# Patient Record
Sex: FEMALE | Race: WHITE | HISPANIC OR LATINO | Employment: FULL TIME | URBAN - METROPOLITAN AREA
[De-identification: names, ages, dates, MRNs, and addresses within clinical notes are randomized per-mention and may not be internally consistent; named-entity substitution may affect disease eponyms.]

---

## 2017-01-12 ENCOUNTER — GENERIC CONVERSION - ENCOUNTER (OUTPATIENT)
Dept: OTHER | Facility: OTHER | Age: 32
End: 2017-01-12

## 2017-01-19 ENCOUNTER — ALLSCRIPTS OFFICE VISIT (OUTPATIENT)
Dept: OTHER | Facility: OTHER | Age: 32
End: 2017-01-19

## 2017-02-01 ENCOUNTER — GENERIC CONVERSION - ENCOUNTER (OUTPATIENT)
Dept: OTHER | Facility: OTHER | Age: 32
End: 2017-02-01

## 2017-02-17 ENCOUNTER — ALLSCRIPTS OFFICE VISIT (OUTPATIENT)
Dept: OTHER | Facility: OTHER | Age: 32
End: 2017-02-17

## 2017-02-22 ENCOUNTER — GENERIC CONVERSION - ENCOUNTER (OUTPATIENT)
Dept: OTHER | Facility: OTHER | Age: 32
End: 2017-02-22

## 2017-03-15 ENCOUNTER — GENERIC CONVERSION - ENCOUNTER (OUTPATIENT)
Dept: OTHER | Facility: OTHER | Age: 32
End: 2017-03-15

## 2017-03-16 ENCOUNTER — ALLSCRIPTS OFFICE VISIT (OUTPATIENT)
Dept: OTHER | Facility: OTHER | Age: 32
End: 2017-03-16

## 2017-03-16 DIAGNOSIS — M51.17 INTERVERTEBRAL DISC DISORDER WITH RADICULOPATHY OF LUMBOSACRAL REGION: ICD-10-CM

## 2017-03-17 ENCOUNTER — GENERIC CONVERSION - ENCOUNTER (OUTPATIENT)
Dept: OTHER | Facility: OTHER | Age: 32
End: 2017-03-17

## 2017-04-05 ENCOUNTER — GENERIC CONVERSION - ENCOUNTER (OUTPATIENT)
Dept: OTHER | Facility: OTHER | Age: 32
End: 2017-04-05

## 2017-04-06 ENCOUNTER — ALLSCRIPTS OFFICE VISIT (OUTPATIENT)
Dept: OTHER | Facility: OTHER | Age: 32
End: 2017-04-06
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
## 2017-04-25 ENCOUNTER — GENERIC CONVERSION - ENCOUNTER (OUTPATIENT)
Dept: OTHER | Facility: OTHER | Age: 32
End: 2017-04-25

## 2017-05-04 ENCOUNTER — ALLSCRIPTS OFFICE VISIT (OUTPATIENT)
Dept: OTHER | Facility: OTHER | Age: 32
End: 2017-05-04

## 2017-05-16 ENCOUNTER — ALLSCRIPTS OFFICE VISIT (OUTPATIENT)
Dept: OTHER | Facility: OTHER | Age: 32
End: 2017-05-16

## 2017-05-30 ENCOUNTER — HOSPITAL ENCOUNTER (EMERGENCY)
Facility: HOSPITAL | Age: 32
Discharge: HOME | End: 2017-05-30
Attending: EMERGENCY MEDICINE | Admitting: EMERGENCY MEDICINE

## 2017-05-30 VITALS
RESPIRATION RATE: 18 BRPM | TEMPERATURE: 98.2 F | BODY MASS INDEX: 39.27 KG/M2 | SYSTOLIC BLOOD PRESSURE: 110 MMHG | DIASTOLIC BLOOD PRESSURE: 74 MMHG | HEIGHT: 64 IN | OXYGEN SATURATION: 97 % | HEART RATE: 70 BPM | WEIGHT: 230 LBS

## 2017-05-30 DIAGNOSIS — R51.9 HEADACHE: Primary | ICD-10-CM

## 2017-05-30 LAB
BACTERIA UR QL AUTO: ABNORMAL /HPF
BILIRUB UR QL STRIP: NEGATIVE
CLARITY UR: CLEAR
COLOR UR: YELLOW
COLOR, POC: NORMAL
GLUCOSE UR STRIP-MCNC: NEGATIVE MG/DL
HCG UR QL: NEGATIVE
HGB UR QL STRIP.AUTO: ABNORMAL
HYALINE CASTS #/AREA URNS LPF: ABNORMAL /LPF
KETONES UR STRIP-MCNC: NEGATIVE MG/DL
LEUKOCYTE ESTERASE UR QL STRIP: NEGATIVE
NITRITE UR QL STRIP: NEGATIVE
NON-SQ EPI CELLS URNS QL MICRO: ABNORMAL /HPF
PH UR STRIP.AUTO: 6 [PH] (ref 4.5–8)
PROT UR STRIP-MCNC: ABNORMAL MG/DL
RBC #/AREA URNS AUTO: ABNORMAL /HPF
SP GR UR STRIP.AUTO: 1.02 (ref 1–1.03)
UROBILINOGEN UR QL STRIP.AUTO: 0.2 E.U./DL
WBC #/AREA URNS AUTO: ABNORMAL /HPF

## 2017-05-30 PROCEDURE — 96361 HYDRATE IV INFUSION ADD-ON: CPT

## 2017-05-30 PROCEDURE — 96374 THER/PROPH/DIAG INJ IV PUSH: CPT

## 2017-05-30 PROCEDURE — 81002 URINALYSIS NONAUTO W/O SCOPE: CPT | Performed by: EMERGENCY MEDICINE

## 2017-05-30 PROCEDURE — 81001 URINALYSIS AUTO W/SCOPE: CPT

## 2017-05-30 PROCEDURE — 99283 EMERGENCY DEPT VISIT LOW MDM: CPT

## 2017-05-30 PROCEDURE — 96375 TX/PRO/DX INJ NEW DRUG ADDON: CPT

## 2017-05-30 PROCEDURE — 81025 URINE PREGNANCY TEST: CPT | Performed by: EMERGENCY MEDICINE

## 2017-05-30 RX ORDER — KETOROLAC TROMETHAMINE 30 MG/ML
15 INJECTION, SOLUTION INTRAMUSCULAR; INTRAVENOUS ONCE
Status: COMPLETED | OUTPATIENT
Start: 2017-05-30 | End: 2017-05-30

## 2017-05-30 RX ORDER — METOCLOPRAMIDE HYDROCHLORIDE 5 MG/ML
10 INJECTION INTRAMUSCULAR; INTRAVENOUS ONCE
Status: COMPLETED | OUTPATIENT
Start: 2017-05-30 | End: 2017-05-30

## 2017-05-30 RX ORDER — DIPHENHYDRAMINE HYDROCHLORIDE 50 MG/ML
25 INJECTION INTRAMUSCULAR; INTRAVENOUS ONCE
Status: COMPLETED | OUTPATIENT
Start: 2017-05-30 | End: 2017-05-30

## 2017-05-30 RX ADMIN — METOCLOPRAMIDE 10 MG: 5 INJECTION, SOLUTION INTRAMUSCULAR; INTRAVENOUS at 16:15

## 2017-05-30 RX ADMIN — SODIUM CHLORIDE 1000 ML: 0.9 INJECTION, SOLUTION INTRAVENOUS at 16:15

## 2017-05-30 RX ADMIN — DIPHENHYDRAMINE HYDROCHLORIDE 25 MG: 50 INJECTION, SOLUTION INTRAMUSCULAR; INTRAVENOUS at 16:15

## 2017-05-30 RX ADMIN — KETOROLAC TROMETHAMINE 15 MG: 30 INJECTION, SOLUTION INTRAMUSCULAR at 16:15

## 2017-06-06 ENCOUNTER — ALLSCRIPTS OFFICE VISIT (OUTPATIENT)
Dept: OTHER | Facility: OTHER | Age: 32
End: 2017-06-06

## 2017-06-06 DIAGNOSIS — D64.9 ANEMIA: ICD-10-CM

## 2017-06-06 DIAGNOSIS — Z00.00 ENCOUNTER FOR GENERAL ADULT MEDICAL EXAMINATION WITHOUT ABNORMAL FINDINGS: ICD-10-CM

## 2017-06-06 DIAGNOSIS — M54.16 RADICULOPATHY OF LUMBAR REGION: ICD-10-CM

## 2017-06-07 ENCOUNTER — APPOINTMENT (OUTPATIENT)
Dept: LAB | Facility: HOSPITAL | Age: 32
End: 2017-06-07

## 2017-06-07 ENCOUNTER — TRANSCRIBE ORDERS (OUTPATIENT)
Dept: LAB | Facility: HOSPITAL | Age: 32
End: 2017-06-07

## 2017-06-07 DIAGNOSIS — Z00.00 ENCOUNTER FOR GENERAL ADULT MEDICAL EXAMINATION WITHOUT ABNORMAL FINDINGS: ICD-10-CM

## 2017-06-07 DIAGNOSIS — D64.9 ANEMIA: ICD-10-CM

## 2017-06-07 DIAGNOSIS — M54.16 RADICULOPATHY OF LUMBAR REGION: ICD-10-CM

## 2017-06-07 LAB
ALBUMIN SERPL BCP-MCNC: 3.3 G/DL (ref 3.5–5)
ALP SERPL-CCNC: 82 U/L (ref 46–116)
ALT SERPL W P-5'-P-CCNC: 23 U/L (ref 12–78)
ANION GAP SERPL CALCULATED.3IONS-SCNC: 8 MMOL/L (ref 4–13)
AST SERPL W P-5'-P-CCNC: 19 U/L (ref 5–45)
BASOPHILS # BLD AUTO: 0.07 THOUSANDS/ΜL (ref 0–0.1)
BASOPHILS NFR BLD AUTO: 1 % (ref 0–1)
BILIRUB SERPL-MCNC: 0.56 MG/DL (ref 0.2–1)
BUN SERPL-MCNC: 9 MG/DL (ref 5–25)
CALCIUM SERPL-MCNC: 9.1 MG/DL (ref 8.3–10.1)
CHLORIDE SERPL-SCNC: 106 MMOL/L (ref 100–108)
CHOLEST SERPL-MCNC: 173 MG/DL (ref 50–200)
CO2 SERPL-SCNC: 26 MMOL/L (ref 21–32)
CREAT SERPL-MCNC: 0.81 MG/DL (ref 0.6–1.3)
EOSINOPHIL # BLD AUTO: 0.5 THOUSAND/ΜL (ref 0–0.61)
EOSINOPHIL NFR BLD AUTO: 6 % (ref 0–6)
ERYTHROCYTE [DISTWIDTH] IN BLOOD BY AUTOMATED COUNT: 12.7 % (ref 11.6–15.1)
GFR SERPL CREATININE-BSD FRML MDRD: >60 ML/MIN/1.73SQ M
GLUCOSE P FAST SERPL-MCNC: 82 MG/DL (ref 65–99)
HCT VFR BLD AUTO: 38.7 % (ref 34.8–46.1)
HDLC SERPL-MCNC: 51 MG/DL (ref 40–60)
HGB BLD-MCNC: 12.6 G/DL (ref 11.5–15.4)
LDLC SERPL CALC-MCNC: 111 MG/DL (ref 0–100)
LYMPHOCYTES # BLD AUTO: 3.11 THOUSANDS/ΜL (ref 0.6–4.47)
LYMPHOCYTES NFR BLD AUTO: 35 % (ref 14–44)
MCH RBC QN AUTO: 30.4 PG (ref 26.8–34.3)
MCHC RBC AUTO-ENTMCNC: 32.6 G/DL (ref 31.4–37.4)
MCV RBC AUTO: 94 FL (ref 82–98)
MONOCYTES # BLD AUTO: 0.86 THOUSAND/ΜL (ref 0.17–1.22)
MONOCYTES NFR BLD AUTO: 10 % (ref 4–12)
NEUTROPHILS # BLD AUTO: 4.46 THOUSANDS/ΜL (ref 1.85–7.62)
NEUTS SEG NFR BLD AUTO: 48 % (ref 43–75)
NRBC BLD AUTO-RTO: 0 /100 WBCS
PLATELET # BLD AUTO: 351 THOUSANDS/UL (ref 149–390)
PMV BLD AUTO: 10.2 FL (ref 8.9–12.7)
POTASSIUM SERPL-SCNC: 3.6 MMOL/L (ref 3.5–5.3)
PROT SERPL-MCNC: 8.1 G/DL (ref 6.4–8.2)
RBC # BLD AUTO: 4.14 MILLION/UL (ref 3.81–5.12)
SODIUM SERPL-SCNC: 140 MMOL/L (ref 136–145)
TRIGL SERPL-MCNC: 53 MG/DL
VIT B12 SERPL-MCNC: 522 PG/ML (ref 100–900)
WBC # BLD AUTO: 9.02 THOUSAND/UL (ref 4.31–10.16)

## 2017-06-07 PROCEDURE — 80061 LIPID PANEL: CPT

## 2017-06-07 PROCEDURE — 85025 COMPLETE CBC W/AUTO DIFF WBC: CPT

## 2017-06-07 PROCEDURE — 36415 COLL VENOUS BLD VENIPUNCTURE: CPT

## 2017-06-07 PROCEDURE — 82607 VITAMIN B-12: CPT

## 2017-06-07 PROCEDURE — 80053 COMPREHEN METABOLIC PANEL: CPT

## 2017-06-08 ENCOUNTER — ALLSCRIPTS OFFICE VISIT (OUTPATIENT)
Dept: OTHER | Facility: OTHER | Age: 32
End: 2017-06-08

## 2017-06-13 ENCOUNTER — ALLSCRIPTS OFFICE VISIT (OUTPATIENT)
Dept: OTHER | Facility: OTHER | Age: 32
End: 2017-06-13

## 2017-08-17 ENCOUNTER — ALLSCRIPTS OFFICE VISIT (OUTPATIENT)
Dept: OTHER | Facility: OTHER | Age: 32
End: 2017-08-17

## 2017-08-30 ENCOUNTER — HOSPITAL ENCOUNTER (EMERGENCY)
Facility: HOSPITAL | Age: 32
Discharge: HOME | End: 2017-08-30
Attending: EMERGENCY MEDICINE | Admitting: EMERGENCY MEDICINE

## 2017-08-30 VITALS
WEIGHT: 230 LBS | OXYGEN SATURATION: 99 % | DIASTOLIC BLOOD PRESSURE: 60 MMHG | HEART RATE: 86 BPM | SYSTOLIC BLOOD PRESSURE: 129 MMHG | HEIGHT: 64 IN | BODY MASS INDEX: 39.27 KG/M2 | TEMPERATURE: 98.3 F | RESPIRATION RATE: 20 BRPM

## 2017-08-30 DIAGNOSIS — G89.29 CHRONIC BILATERAL LOW BACK PAIN WITH RIGHT-SIDED SCIATICA: Primary | ICD-10-CM

## 2017-08-30 DIAGNOSIS — M54.41 CHRONIC BILATERAL LOW BACK PAIN WITH RIGHT-SIDED SCIATICA: Primary | ICD-10-CM

## 2017-08-30 PROCEDURE — 99283 EMERGENCY DEPT VISIT LOW MDM: CPT

## 2017-08-30 PROCEDURE — 96372 THER/PROPH/DIAG INJ SC/IM: CPT

## 2017-08-30 RX ORDER — NORTRIPTYLINE HYDROCHLORIDE 10 MG/1
10 CAPSULE ORAL
Qty: 10 CAPSULE | Refills: 0 | Status: SHIPPED | OUTPATIENT
Start: 2017-08-30 | End: 2018-02-09

## 2017-08-30 RX ORDER — IBUPROFEN 200 MG
600 TABLET ORAL EVERY 6 HOURS PRN
COMMUNITY
End: 2018-02-09

## 2017-08-30 RX ORDER — KETOROLAC TROMETHAMINE 30 MG/ML
15 INJECTION, SOLUTION INTRAMUSCULAR; INTRAVENOUS ONCE
Status: COMPLETED | OUTPATIENT
Start: 2017-08-30 | End: 2017-08-30

## 2017-08-30 RX ORDER — ACETAMINOPHEN 325 MG/1
975 TABLET ORAL ONCE
Status: COMPLETED | OUTPATIENT
Start: 2017-08-30 | End: 2017-08-30

## 2017-08-30 RX ORDER — LIDOCAINE 50 MG/G
1 PATCH TOPICAL ONCE
Status: DISCONTINUED | OUTPATIENT
Start: 2017-08-30 | End: 2017-08-30 | Stop reason: HOSPADM

## 2017-08-30 RX ORDER — CETIRIZINE HYDROCHLORIDE 10 MG/1
10 TABLET ORAL DAILY
COMMUNITY

## 2017-08-30 RX ADMIN — ACETAMINOPHEN 975 MG: 325 TABLET, FILM COATED ORAL at 18:44

## 2017-08-30 RX ADMIN — KETOROLAC TROMETHAMINE 15 MG: 30 INJECTION, SOLUTION INTRAMUSCULAR at 18:46

## 2017-08-30 RX ADMIN — LIDOCAINE 1 PATCH: 50 PATCH CUTANEOUS at 18:49

## 2017-09-20 ENCOUNTER — ALLSCRIPTS OFFICE VISIT (OUTPATIENT)
Dept: OTHER | Facility: OTHER | Age: 32
End: 2017-09-20

## 2017-10-05 ENCOUNTER — ALLSCRIPTS OFFICE VISIT (OUTPATIENT)
Dept: RADIOLOGY | Facility: CLINIC | Age: 32
End: 2017-10-05
Payer: OTHER MISCELLANEOUS

## 2017-10-12 ENCOUNTER — ALLSCRIPTS OFFICE VISIT (OUTPATIENT)
Dept: OTHER | Facility: OTHER | Age: 32
End: 2017-10-12

## 2017-10-12 DIAGNOSIS — M51.17 INTERVERTEBRAL DISC DISORDER WITH RADICULOPATHY OF LUMBOSACRAL REGION: ICD-10-CM

## 2017-10-26 NOTE — PROGRESS NOTES
Assessment  1  Sacroiliitis (720 2) (M46 1)   2  Intervertebral disc disorders with radiculopathy, lumbosacral region (724 4) (M51 17)    Plan  Intervertebral disc disorders with radiculopathy, lumbosacral region    · Lyrica 75 MG Oral Capsule; Take 1 capsule twice daily   Rx By: Zoe Clinton; Dispense: 30 Days ; #:60 Capsule; Refill: 1;For: Intervertebral disc disorders with radiculopathy, lumbosacral region; MADISON = N; Print Rx  Lumbar radiculopathy    · Gabapentin 100 MG Oral Capsule   Rx By: Nii Bennett; Dispense: 30 Days ; #:60 Capsule; Refill: 5;For: Lumbar radiculopathy; MADISON = N; Sent To: [facility] # [id number]; Last Updated By: Zoe Clinton; 9/20/2017 11:06:41 AM    Discussion/Summary    The patient at this time is experiencing ongoing pain in the right buttock and leg  She did not respond to the 2 lumbar epidural steroid injections that were previously performed, so I recommended not repeating that  She has a component of sacroiliitis I discussed performing a right sacroiliac joint injection to help reduce swelling and inflammation in that area  She was apprised of the most common risks and would like to proceed  She'll be scheduled for an upcoming Tuesday or Thursday under fluoroscopic guidance  In addition, since gabapentin is not providing significant relief, I will switch her to Lyrica 75 mg twice daily  She will call with an update next week on how the medication is working  Chief Complaint  1  Back Pain    History of Present Illness  The patient is a 75-year-old female with a history of lumbar disc herniation with radiculopathy who returns for follow-up  She was last seen in December 2016 at which time she received an epidural steroid injection which was which led to no relief in symptoms  She is continuing with constant pain in the lower back down the right leg described to be sharp, throbbing, pressure-like with numbness and paresthesias  She was sent for a possible third epidural steroid injection  She continues on gabapentin 100 mg twice daily which provides mild relief  Yaz Obrien presents with complaints of gradual onset of constant episodes of bilateral lower back pain, described as sharp, stinging, throbbing and tingling, radiating to the right buttock, right thigh and right lower leg  On a scale of 1 to 10, the patient rates the pain as 10  Symptoms are worsening  Review of Systems    Constitutional: no fever,-- no recent weight gain-- and-- no recent weight loss  Eyes: no double vision-- and-- no blurry vision  Cardiovascular: no chest pain,-- no palpitations-- and-- no lower extremity edema  Respiratory: no complaints of shortness of breath-- and-- no wheezing  Musculoskeletal: difficulty walking-- and-- muscle weakness, but-- no joint stiffness,-- no joint swelling,-- no limb swelling,-- no pain in extremity-- and-- no decreased range of motion  Neurological: no dizziness,-- no difficulty swallowing,-- no memory loss,-- no loss of consciousness-- and-- no seizures  Gastrointestinal: no nausea,-- no vomiting,-- no constipation-- and-- no diarrhea  Genitourinary: no difficulty initiating urine stream,-- no genital pain-- and-- no frequent urination  Integumentary: no complaints of skin rash  Psychiatric: no depression  Endocrine: no excessive thirst,-- no adrenal disease,-- no hypothyroidism-- and-- no hyperthyroidism  Hematologic/Lymphatic: no tendency for easy bruising-- and-- no tendency for easy bleeding  ROS reviewed  Active Problems  1  Abnormal weight gain (783 1) (R63 5)   2  Allergic rhinitis (477 9) (J30 9)   3  Anemia (285 9) (D64 9)   4  Chronic pain disorder (338 4) (G89 4)   5  Depression with anxiety (300 4) (F41 8)   6  Eczema (692 9) (L30 9)   7  Intervertebral disc disorders with radiculopathy, lumbosacral region (724 4) (M51 17)   8  Lumbar radiculopathy (724 4) (M54 16)   9   Morbid obesity due to excess calories (278 01) (E66 01)   10  Spinal stenosis of lumbar region (724 02) (M48 06)   11  Work related injury (959 9) (Y99 0)    Past Medical History  1  History of Cellulitis of hand (682 4) (L03 119)   2  Eczema (692 9) (L30 9)    The active problems and past medical history were reviewed and updated today  Surgical History  1  History of Back Surgery    The surgical history was reviewed and updated today  Family History  Mother    1  Family history of anemia (V18 2) (Z83 2)   2  Family history of hypertension (V17 49) (Z82 49)   3  Family history of migraine headaches (V17 2) (Z82 0)  Father    4  Family history of Coronary Artery Disease (V17 49)   5  Family history of    6  Family history of hypertension (V17 49) (Z82 49)  Sister    9  Family history of asthma (V17 5) (Z82 5)  Brother    8  Family history of asthma (V17 5) (Z82 5)  Uncle    9  Family history of bipolar disorder (V17 0) (Z81 8)  Family History    10  Family history of Asthma (V17 5)   11  Family history of bipolar disorder (V17 0) (Z81 8)   12  Denied: Family history of drug addiction    The family history was reviewed and updated today  Social History   · Being A Social Drinker   · Caffeine use (V49 89) (F15 90)   · Current Every Day Smoker (305 1)   · Does not exercise (V69 0) (Z72 3)   · Has 1 child   · High school or GED   · Lives with mother (single parent)   · Occupation   · Sexual abstinence  The social history was reviewed and updated today  The social history was reviewed and is unchanged  Current Meds   1  Advil 200 MG Oral Capsule; TAKE 2 CAPSULE Daily PRN; Therapy: (Recorded:26Qab6979) to Recorded   2  Benadryl 25 MG CAPS; TAKE 1 CAPSULE AT BEDTIME; Therapy: (Recorded:19Lxb1896) to Recorded   3  BusPIRone HCl - 10 MG Oral Tablet; TAKE 1 TABLET AT BEDTIME; Therapy: 57TEA4269 to (Evaluate:35Cbl0798)  Requested for: 27CWQ8193; Last   Rx:2017 Ordered   4  Gabapentin 100 MG Oral Capsule;  Take 1 capsule twice daily; Therapy: 96XPV7413 to (Evaluate:53Pbr9047)  Requested for: 76MJX1201; Last   Rx:06Jun2017; Status: ACTIVE - Transmit to Pharmacy - Awaiting Verification Ordered   5  Meloxicam 7 5 MG Oral Tablet; TAKE 1 TABLET TWICE DAILY WITH FOOD; Therapy: 34MJL4657 to (Evaluate:01Lbp6605)  Requested for: 70USI0921; Last   Rx:16Mar2017 Ordered   6  Vanicream External Cream; APPLY SPARINGLY TO AFFECTED AREA(S) TWICE DAILY; Therapy: 06GCU1610 to (Last Rx:06Jun2017)  Requested for: 06Jun2017; Status: ACTIVE   - Transmit to Pharmacy - Awaiting Verification Ordered   7  ZyrTEC Allergy 10 MG Oral Tablet; Take 1 tablet daily; Therapy: (Recorded:16May2017) to Recorded    The medication list was reviewed and updated today  Allergies  1  Cipro SOLN  2  Peanuts   3  Seasonal    Vitals  Vital Signs    Recorded: 51CQT5676 10:33AM   Temperature 98 8 F   Heart Rate 82   Respiration 18   Systolic 579   Diastolic 78   Height 5 ft 4 in   Weight 229 lb    BMI Calculated 39 31   BSA Calculated 2 07   O2 Saturation 98   Pain Scale 10     Physical Exam    Constitutional   General appearance: Abnormal   obese  Eyes   Sclera: anicteric   HEENT   Hearing grossly intact  Pulmonary   Respiratory effort: Even and unlabored  Cardiovascular   Examination of extremities: No edema or pitting edema present  Skin   Skin and subcutaneous tissue: Normal without rashes or lesions, well hydrated  Psychiatric   Mood and affect: Mood and affect appropriate  Lumbar/Sacral Spine examination demonstrates Lumbosacral Spine:   Appearance: Normal    Tenderness: at the sacral spine,-- right sciatic notch-- and-- the right sacroiliac joint  Lumbosacral Spine Sensory: intact to light touch and pinprick in the lower extremities  Flexion was restricted-- and-- was painful  Extension was restricted-- and-- was painful  Left lateral flexion was not restricted-- and-- was painless  Right lateral flexion was restricted-- and-- was painful  Rotation to the left was not restricted-- and-- was painless  Rotation to the right was restricted-- and-- was painful  Foot and ankle strength was normal bilaterally  Knee strength was normal bilaterally  Hip strength was normal bilaterally  Results/Data  Results Free Text Form [facility]:   Results    I personally reviewed the films/images in the office today        Future Appointments    Date/Time Provider Specialty Site   10/05/2017 08:00 AM Lianet Laguna,  Physiatry [facility]     Signatures   Electronically signed by : Marleny Rasheed MD; Sep 20 2017 11:08AM EST                       (Author)

## 2017-11-07 ENCOUNTER — ALLSCRIPTS OFFICE VISIT (OUTPATIENT)
Dept: OTHER | Facility: OTHER | Age: 32
End: 2017-11-07

## 2017-12-05 ENCOUNTER — ALLSCRIPTS OFFICE VISIT (OUTPATIENT)
Dept: RADIOLOGY | Facility: CLINIC | Age: 32
End: 2017-12-05
Payer: OTHER MISCELLANEOUS

## 2017-12-29 ENCOUNTER — ALLSCRIPTS OFFICE VISIT (OUTPATIENT)
Dept: OTHER | Facility: OTHER | Age: 32
End: 2017-12-29

## 2018-01-10 NOTE — PROGRESS NOTES
Assessment    1  Intervertebral disc disorders with radiculopathy, lumbosacral region (724 4) (M51 17)   2  Work related injury (959 9) (Y99 0)    Plan  Intervertebral disc disorders with radiculopathy, lumbosacral region    · Follow-up Visit in 4 Weeks Evaluation and Treatment  Follow-up  Status: Hold For -  Scheduling  Requested for: 08ZPE2556    Discussion/Summary  Currently, the condition is improving  Impression: low back pain and disc herniation  There are no changes in medication management  Treatment plan includes physical therapy  Patient discussion: discussed with the patient  Chief Complaint  Work related LBP   1/19 follow up   accompanied by   2/17 follow up accompanied by   History of Present Illness  33 t yo female with discogenic LBP   has had two Stephen   has phone follow up pending next week   states no pain during daytime if active   notes pain with recumbant position at HS, local to SI, feels like it moves   no PT since October due to school schedule  Is on per belkis care at [facility]   has restrictions  Has had prior IDET 2012 (Naftulin) Taking no meds for LBP  Feels 70 % improvement with LESIs  Had been working with restriction untill September  Has DONTA with [employer] in January 1/19 had DONTA and released to full duty   has yet to be sent official  to usual occupation  last worked September  Viktoriya Valdovinos on per belkis now   reports URI since Decc seen at ED  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 O radicular component  , but as noted in HPI  Active Problems    1  Allergic rhinitis (477 9) (J30 9)   2   Cellulitis of hand (062 4) (L03 119)   3  Eczema (692 9) (L30 9)   4  Intervertebral disc disorders with radiculopathy, lumbosacral region (724 4) (M51 17)   5  Lumbar radiculopathy (724 4) (M54 16)    Past Medical History    1  Eczema (692 9) (L30 9)    The active problems and past medical history were reviewed and updated today  Surgical History    1  History of Back Surgery    The surgical history was reviewed and updated today  Family History  Father    1  Family history of Coronary Artery Disease (V17 49)  Family History    2  Family history of Asthma (V17 5)    Social History    · Being A Social Drinker   · Current Every Day Smoker (305 1)    Current Meds   1  Benadryl Allergy 25 MG Oral Tablet; Therapy: (Recorded:12Awg6720) to Recorded   2  Ibuprofen 800 MG Oral Tablet; Therapy: (Concha Nj) to Recorded   3  Meloxicam 7 5 MG Oral Tablet; TAKE 1 TABLET TWICE DAILY WITH FOOD; Therapy: 04SHY4374 to (74 831 591)  Requested for: 36RVC9990; Last   Rx:89Hwk1366 Ordered   4  ZyrTEC Allergy 10 MG Oral Capsule; Therapy: (Recorded:49Ddr1600) to Recorded    The medication list was reviewed and updated today  Allergies    1  Cipro SOLN    Vitals  Signs   Recorded: 87ESL2428 08:57AM   Heart Rate: 176  Systolic: 764  Diastolic: 86  Height: 5 ft 4 in  Weight: 225 lb   BMI Calculated: 38 62  BSA Calculated: 2 07    Physical Exam    Constitutional   General appearance: Abnormal   overweight  Lumbar/Sacral Spine examination demonstrates Lumbosacral Spine:   Evaluation of Muscle Stretch Reflexes on the right side demonstrates 1/4 Hamstring Reflex, 1/4 Knee Jerk Reflex and 1/4 Ankle Jerk Reflex, but negative right ankle clonus  Evaluation of Muscle Stretch Reflexes on the left side demonstrates 1/4 Hamstring Reflex, 1/4 Knee Jerk Reflex, 1/4 Ankle Jerk Reflex and negative left ankle clonus  Results/Data    Diagnostic Review [place] report from 2/15  [facility] [place] [facility] [place] pt not at 50 # yet and four weeks of PTx advised  Signatures   Electronically signed by : Melita Clinton DO; Feb 17 2017  9:21AM EST                       (Author)

## 2018-01-10 NOTE — PROGRESS NOTES
Assessment    1  Intervertebral disc disorders with radiculopathy, lumbosacral region (724 4) (M51 17)   2  Lumbar radiculopathy (724 4) (M54 16)    Plan  Lumbar radiculopathy    · Follow-up Visit in 4 Weeks Evaluation and Treatment  Follow-up  Status: Hold For -  Scheduling  Requested for: 93XNG0279    Continue PTx and physical restrictions  Discussion/Summary  Impression: low back pain, lumbar strain and degenerative disc disease of the lumbar spine  Currently, the condition is improving  There are no changes in medication management  Treatment plan includes activity modification and physical therapy  Patient discussion: discussed with the patient  Chief Complaint  Work related LBP   1/19 follow up   accompanied by       History of Present Illness  33 t yo female with discogenic LBP   has had two Stephen   has phone follow up pending next week   states no pain during daytime if active   notes pain with recumbant position at HS, local to , feels like it moves   no PT since October due to school schedule  Is on per belkis care at [facility] [employer]   has restrictions  Has had prior IDET 2012 (Naftulin) Taking no meds for LBP  Feels 70 % improvement with LESIs  Had been working with restriction untill September  Has DONTA with [employer] in January 1/19 had DONTA and released to full duty   has yet to be sent official  to usual occupation  last worked September  Lisa Villasenor on per belkis now   reports URI since Decc seen at ED  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  Currently today some left lateral hip and buttock pain  Attends PT at [facility] last two weeks  Has no further caer planned thru PM       Review of Systems    Neurological: no numbness  Active Problems    1  Allergic rhinitis (477 9) (J30 9)   2  Cellulitis of hand (682 4) (L03 119)   3  Eczema (692 9) (L30 9)   4  Intervertebral disc disorders with radiculopathy, lumbosacral region (724 4) (M51 17)   5   Lumbar radiculopathy (724 4) (M54 16)    Past Medical History    1  Eczema (692 9) (L30 9)    The active problems and past medical history were reviewed and updated today  Surgical History    1  History of Back Surgery    Family History  Father    1  Family history of Coronary Artery Disease (V17 49)  Family History    2  Family history of Asthma (V17 5)    Social History    · Being A Social Drinker   · Current Every Day Smoker (305 1)    Current Meds   1  Benadryl Allergy 25 MG Oral Tablet; Therapy: (Recorded:30Kzk3828) to Recorded   2  Ibuprofen 800 MG Oral Tablet; Therapy: (Amber Caldera) to Recorded   3  Meloxicam 7 5 MG Oral Tablet; TAKE 1 TABLET TWICE DAILY WITH FOOD; Therapy: 20CHB9715 to (968 289 135)  Requested for: 78YFV3682; Last   Rx:47Bmx3282 Ordered   4  ZyrTEC Allergy 10 MG Oral Capsule; Therapy: (Recorded:49Izx1133) to Recorded    The medication list was reviewed and updated today  Allergies    1  Cipro SOLN    Vitals  Signs   Recorded: 44UJK3433 08:51AM   Heart Rate: 884  Systolic: 588  Diastolic: 88  Height: 5 ft 4 in  Weight: 224 lb   BMI Calculated: 38 45  BSA Calculated: 2 06    Physical Exam    Constitutional   General appearance: Abnormal   overweight  Lumbar/Sacral Spine examination demonstrates  right fall gaze on direct questioning,  Lumbosacral Spine:   Evaluation of Muscle Stretch Reflexes on the right side demonstrates 2/4 Hamstring Reflex and 2/4 Knee Jerk Reflex  Evaluation of Muscle Stretch Reflexes on the left side demonstrates 2/4 Knee Jerk Reflex  Results/Data    Diagnostic Review Dr Paige Linton briefly noted   review of old records was pensing at time of dictation  PTx note reviewed from 1/12 does not demonstrate work ability        Signatures   Electronically signed by : Susan Villarreal DO; Jan 19 2017  9:22AM EST                       (Author)

## 2018-01-10 NOTE — PROGRESS NOTES
Plan  Intervertebral disc disorders with radiculopathy, lumbosacral region, Lumbar  radiculopathy    · Follow-up Visit in 4 Weeks Evaluation and Treatment  Follow-up  Status: Hold For -  Scheduling  Requested for: 69UGV0433   · Pain Management Follow Up Evaluation and Treatment  Follow-up needs at least  telephone follow up after injection [phone]  Status: Hold For - Scheduling   Requested for: 26HLI4670    Discussion/Summary  Impression: low back pain and disc herniation  Currently, the condition is improving  The diagnostic plan includes no new imaging indicated  There are no changes in medication management  Treatment plan includes activity modification and pain medicine consultation  Patient discussion: discussed with the patient, holding PT until after Pain Medicine treatment completed  Contact your PCP at [facility] for further evaluation if numbness in left hand  Chief Complaint  Follow up jael work related LBP  History of Present Illness  34 yo female with known lumbar disc problem from work related injury L-5/ S-1  Had LESI 11/1 now has less pain about 4-5/10  Currently on per belkis at her request due to school   has not had follow up with Pain Medicine yet  Having tingling in left hand for about a week  Off meloxicam  Not in PT due to school schedule  PCP is [facility] and Micaela [facility]  Feel "weak" to attempt lifting laundry basket      Review of Systems    Musculoskeletal: as noted in HPI  Active Problems    1  Allergic rhinitis (477 9) (J30 9)   2  Cellulitis of hand (682 4) (L03 119)   3  Eczema (692 9) (L30 9)   4  Intervertebral disc disorders with radiculopathy, lumbosacral region (724 4) (M51 17)   5  Lumbar radiculopathy (724 4) (M54 16)    Past Medical History    1  Eczema (692 9) (L30 9)    The active problems and past medical history were reviewed and updated today  Surgical History    1   History of Back Surgery    The surgical history was reviewed and updated today  Family History  Father    1  Family history of Coronary Artery Disease (V17 49)  Family History    2  Family history of Asthma (V17 5)    Social History    · Being A Social Drinker   · Current Every Day Smoker (305 1)    Current Meds   1  Benadryl Allergy 25 MG Oral Tablet; Therapy: (Recorded:62Lag7296) to Recorded   2  Ibuprofen 800 MG Oral Tablet; Therapy: (Camila Bjornstad) to Recorded   3  Meloxicam 7 5 MG Oral Tablet; TAKE 1 TABLET TWICE DAILY WITH FOOD; Therapy: 79FEU1959 to (77 873 135)  Requested for: 33DRS0360; Last   Rx:74Jid4777 Ordered   4  ZyrTEC Allergy 10 MG Oral Capsule; Therapy: (Recorded:65Gus9334) to Recorded    The medication list was reviewed and updated today  Allergies    1  Cipro SOLN    Vitals  Signs   Recorded: 12DZB4704 91:37UJ   Systolic: 030  Diastolic: 80  Heart Rate: 100  Height: 5 ft 4 in  Weight: 225 lb   BMI Calculated: 38 62  BSA Calculated: 2 07    Physical Exam    Constitutional   General appearance: Well developed, well nourished, alert, in no distress, non-toxic and no overt pain behavior  Lumbar/Sacral Spine examination demonstrates  SLR negative, able to walk on heels not on right toes (due to "balance")  Lumbosacral Spine:   Evaluation of Muscle Stretch Reflexes on the right side demonstrates 2/4 Hamstring Reflex, 2/4 Knee Jerk Reflex, 2/4 Ankle Jerk Reflex and negative Fuentes Test right upper extremity  Evaluation of Muscle Stretch Reflexes on the left side demonstrates 2/4 Hamstring Reflex, 2/4 Knee Jerk Reflex, 2/4 Ankle Jerk Reflex and negative Fuentes Test left upper extremity  Results/Data  I personally reviewed the films/images/results in the office today  My interpretation follows  MRI Review report only (disc at [facility])   right sided L-5/ S-1 protrusion in face of congenital stenosis  Diagnostic Review only IE from Pain Medicine        Signatures   Electronically signed by : Hudson Hoang DO; Nov 18 2016  1:36PM EST                       (Author)

## 2018-01-10 NOTE — MISCELLANEOUS
Message  Return to work or school:   Avi Joel is under my professional care  She was seen in my office on Nov 7th   She is able to return to work on  as able    She is able to perform activities of daily living without limitations  , She is not able to participate in sports or gym class  No lift / carry / push / pull > 25 #        Signatures   Electronically signed by : Gayatri Taylor DO; Nov 7 2017 11:53AM EST                       (Author)

## 2018-01-11 NOTE — MISCELLANEOUS
Message  Return to work or school:   Dave Conklin is under my professional care  She was seen in my office on 1/19th   She is able to return to work on  with restrictions      20 # lift carry/ push / pull limit till next OV          Signatures   Electronically signed by : Kathrene Prader, DO; Jan 19 2017  9:23AM EST                       (Author)

## 2018-01-11 NOTE — PROGRESS NOTES
Assessment    1  Intervertebral disc disorders with radiculopathy, lumbosacral region (724 4) (M51 17)    Plan  Intervertebral disc disorders with radiculopathy, lumbosacral region    · Meloxicam 7 5 MG Oral Tablet; TAKE 1 TABLET TWICE DAILY WITH FOOD   · * MRI LUMBAR SPINE W WO CONTRAST; Status:Need Information - Financial  Authorization; Requested for:16Mar2017;    · Follow Up After Tests Complete Evaluation and Treatment  Follow-up  Status: Hold For -  Scheduling  Requested for: 57BSA0998    Discussion/Summary  Impression: low back pain and disc herniation  Currently, the condition is not responding to treatment  The diagnostic plan includes lumbar spine MRI  Medication changes are as documented in orders  Treatment plan includes activity modification  Patient discussion: discussed with the patient, D/W CM  Chief Complaint  Work related LBP   1/19 follow up   accompanied by   2/17 follow up accompanied by   3/16 f/u accompanied by CM  History of Present Illness  33 t yo female with discogenic LBP   has had two LES[place]   has phone follow up pending next week   states no pain during daytime if active   notes pain with recumbant position at HS, local to , feels like it moves   no PT since October due to school schedule  Is on per belkis care at [facility], [employer]   has restrictions  Has had prior IDET 2012 (Naftulin) Taking no meds for LBP  Feels 70 % improvement with LESIs  Had been working with restriction untill September  Has DONTA with Iza Gaston in January 1/19 had DONTA and released to full duty   has yet to be sent official  to usual occupation  last worked September  Nathen Sandy on per belkis now   reports URI since Decc seen at ED  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  Currently today some left lateral hip and buttock pain  Attends PT at [facility] last two weeks  Has no further caer planned thru PM    2/17 working with restrictions at usual employer   in PT   per PT note of 2/15 NOT at level of job capabiltiy but liely am push W/ C    3/16 working full time with restrictions   in PT unable to tolerate shelve level lift of 40 # due to "belly pain" per CM carrier is "waiting" for outcome of PTx  Tullos Shiocton Alexis Scales DONTA physician is waiting for records  Currently having right leg Sx  Review of Systems    Musculoskeletal: as noted in HPI  Active Problems    1  Allergic rhinitis (477 9) (J30 9)   2  Cellulitis of hand (682 4) (L03 119)   3  Eczema (692 9) (L30 9)   4  Intervertebral disc disorders with radiculopathy, lumbosacral region (724 4) (M51 17)   5  Lumbar radiculopathy (724 4) (M54 16)   6  Work related injury (959 9) (Y99 0)    Past Medical History    1  Eczema (692 9) (L30 9)    The active problems and past medical history were reviewed and updated today  Surgical History    1  History of Back Surgery    The surgical history was reviewed and updated today  Family History  Father    1  Family history of Coronary Artery Disease (V17 49)  Family History    2  Family history of Asthma (V17 5)    The family history was reviewed and updated today  Social History    · Being A Social Drinker   · Current Every Day Smoker (305 1)  The social history was reviewed and is unchanged  Current Meds   1  Benadryl Allergy 25 MG Oral Tablet; Therapy: (Recorded:14Khk9256) to Recorded   2  Ibuprofen 800 MG Oral Tablet; Therapy: (Milderd Osier) to Recorded   3  Meloxicam 7 5 MG Oral Tablet; TAKE 1 TABLET TWICE DAILY WITH FOOD; Therapy: 23PJU7566 to ([phone])  Requested for: 15TVH9434; Last   Rx:33Ezg5089 Ordered   4  ZyrTEC Allergy 10 MG Oral Capsule; Therapy: (Recorded:39Nch3523) to Recorded    The medication list was reviewed and updated today  Allergies    1   Cipro SOLN    Vitals  Signs   Recorded: 57ORN4172 08:49AM   Heart Rate: 80  Systolic: 735  Diastolic: 80  Height: 5 ft 4 in  Weight: 228 lb   BMI Calculated: 39 14  BSA Calculated: 2 07    Physical Exam    Constitutional   General appearance: Abnormal   overweight  Lumbar/Sacral Spine examination demonstrates Lumbosacral Spine:   Evaluation of Muscle Stretch Reflexes on the right side demonstrates 1/4 Ankle Jerk Reflex, but 2/4 Hamstring Reflex, 2/4 Knee Jerk Reflex, negative Fuentes Test right upper extremity and negative right ankle clonus  Evaluation of Muscle Stretch Reflexes on the left side demonstrates negative left ankle clonus, but 2/4 Hamstring Reflex, 2/4 Knee Jerk Reflex, 2/4 Ankle Jerk Reflex and negative Fuentes Test left upper extremity        Signatures   Electronically signed by : Neeraj Chong DO; Mar 16 2017  9:17AM EST                       (Author)

## 2018-01-11 NOTE — MISCELLANEOUS
Message  Return to work or school:   Lakeisha Valdez is under my professional care  She was seen in my office on   She is able to return to work on  Max lift / carry / push / pull 30 # rare  Signatures   Electronically signed by : Hudson Hoang DO;  Apr 6 2017  9:10AM EST                       (Author)

## 2018-01-11 NOTE — MISCELLANEOUS
Message  Return to work or school:   Kiana Sellers is under my professional care  She was seen in my office on June 8th   She is able to return to work on  She is able to perform activities of daily living without limitations  , She is not able to participate in sports or gym class  Weight Bearing Status: Full Weight-Bearing  30 # lift pull carry push limit          Signatures   Electronically signed by : Oliver Little DO; Jun 8 2017  9:07AM EST                       (Author)

## 2018-01-12 VITALS
WEIGHT: 228 LBS | SYSTOLIC BLOOD PRESSURE: 110 MMHG | DIASTOLIC BLOOD PRESSURE: 80 MMHG | BODY MASS INDEX: 38.93 KG/M2 | HEIGHT: 64 IN | HEART RATE: 80 BPM

## 2018-01-12 VITALS
BODY MASS INDEX: 39.07 KG/M2 | DIASTOLIC BLOOD PRESSURE: 80 MMHG | SYSTOLIC BLOOD PRESSURE: 124 MMHG | WEIGHT: 228.84 LBS | TEMPERATURE: 99 F | HEIGHT: 64 IN | HEART RATE: 80 BPM

## 2018-01-12 NOTE — PROGRESS NOTES
Assessment  Work related discogenic pain,,,deemed non-surgical   Agree with gabapentin   is to be considered related to treatment of neurogenic painfrom work injury  Repeat LESI  Not convinced needs EDX study  Plan  Intervertebral disc disorders with radiculopathy, lumbosacral region, Lumbar  radiculopathy, Spinal stenosis of lumbar region, Work related injury    · Follow-up Visit in 4 Weeks Evaluation and Treatment  Follow-up  Status: Hold For -  Scheduling  Requested for: 03HNW3877   · Pain Management Follow Up Evaluation and Treatment  Follow-up Consider repeat  LESI  Status: Hold For - Scheduling  Requested for: 05OKU5256    Discussion/Summary  Impression: low back pain, disc herniation, radiculopathy and spinal stenosis  Currently, the condition is unchanged  agree with starting gabapentin Treatment plan includes activity modification  Patient discussion: discussed with the patient, discussed with   Chief Complaint  Work related LBP   1/19 follow up   accompanied by   2/17 follow up accompanied by   3/16 f/u accompanied by CM   4/6 follow after PTx accompanied by CM  5/4 follow up for [facility]  6/8 WRI f/u      History of Present Illness  33 t yo female with discogenic LBP   has had two LESIs   has phone follow up pending next week   states no pain during daytime if active   notes pain with recumbant position at , local to , feels like it moves   no PT since October due to school schedule  Is on per belkis care at [facility], [facility]   has restrictions  Has had prior IDET 2012 (Naftulin) Taking no meds for LBP  Feels 70 % improvement with LESIs  Had been working with restriction untill September  Has DONTA with Pasqual Hair in January 1/19 had DONTA and released to full duty   has yet to be sent official  to usual occupation  last worked September  Louis Polanco on per belkis now   reports URI since Decc seen at ED  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   Currently today some left lateral hip and buttock pain  Attends PT at [facility] last two weeks  Has no further caer planned thru PM    2/17 working with restrictions at usual employer   in PT   per PT note of 2/15 NOT at level of job capabiltiy but liely am push W/ C    3/16 working full time with restrictions   in PT unable to tolerate shelve level lift of 40 # due to "belly pain" per CM carrier is "waiting" for outcome of PTx  Ivis Dolan DONTA physician is waiting for records  Currently having right leg Sx    4/6 follow up for [facility]   has had subjective pain with 40 AND 30 # lifts  Has not been RTW by carrier  Has had TWO LESI by Pain Medicine  5/4 follow up last LESI was December 2016  Has been on HEP since last visit feels worse  Has pain with sneeze  6/8 fu after NS eval  Dr Guicho Rosa advised no surgery, activity mods  , consider neuropathic pain meds  and repeat LESI   last one was Dec  2016  Ivis Dolan PCP Rx 'd gabapentin but has yet to start  Denies having a EDX study  Review of Systems    Musculoskeletal: as noted in HPI  Neurological: right lateral foot  , numbness and tingling  ROS reviewed  Active Problems    1  Abnormal weight gain (783 1) (R63 5)   2  Allergic rhinitis (477 9) (J30 9)   3  Anemia (285 9) (D64 9)   4  Chronic pain disorder (338 4) (G89 4)   5  Depression with anxiety (300 4) (F41 8)   6  Eczema (692 9) (L30 9)   7  Intervertebral disc disorders with radiculopathy, lumbosacral region (724 4) (M51 17)   8  Lumbar radiculopathy (724 4) (M54 16)   9  Morbid obesity due to excess calories (278 01) (E66 01)   10  Spinal stenosis of lumbar region (724 02) (M48 06)   11  Work related injury (959 9) (Y99 0)    Past Medical History    1  History of Cellulitis of hand (682 4) (L03 119)   2  Eczema (692 9) (L30 9)    The active problems and past medical history were reviewed and updated today  Surgical History    1  History of Back Surgery    Family History  Mother    1   Family history of anemia (V18 2) (Z83 2)   2  Family history of hypertension (V17 49) (Z82 49)   3  Family history of migraine headaches (V17 2) (Z82 0)  Father    4  Family history of Coronary Artery Disease (V17 49)   5  Family history of    6  Family history of hypertension (V17 49) (Z82 49)  Sister    9  Family history of asthma (V17 5) (Z82 5)  Brother    8  Family history of asthma (V17 5) (Z82 5)  Uncle    9  Family history of bipolar disorder (V17 0) (Z81 8)  Family History    10  Family history of Asthma (V17 5)   11  Family history of bipolar disorder (V17 0) (Z81 8)   12  Denied: Family history of drug addiction    The family history was reviewed and updated today  Social History    · Being A Social Drinker   · Caffeine use (V49 89) (F15 90)   · Current Every Day Smoker (305 1)   · Does not exercise (V69 0) (Z72 3)   · Has 1 child   · High school or GED   · Lives with mother (single parent)   · Occupation   · Sexual abstinence  The social history was reviewed and is unchanged  Current Meds   1  Advil 200 MG Oral Capsule; TAKE 2 CAPSULE Daily PRN; Therapy: (Recorded:75Xrb5493) to Recorded   2  Benadryl 25 MG CAPS (DiphenhydrAMINE HCl); TAKE 1 CAPSULE AT BEDTIME; Therapy: (Recorded:12Vzi1530) to Recorded   3  Gabapentin 100 MG Oral Capsule; Take 1 capsule twice daily; Therapy: 40QFQ8106 to (Evaluate:66Wud0336)  Requested for: 58MMM3909; Last   Rx:2017; Status: ACTIVE - Transmit to Pharmacy - Awaiting Verification Ordered   4  HydrOXYzine HCl - 10 MG Oral Tablet; TAKE 1 TABLET Twice daily PRN; Therapy: 48RCJ4920 to (Evaluate:07Taw7941)  Requested for: 88KJM9513; Last   Rx:2017; Status: ACTIVE - Transmit to Pharmacy - Awaiting Verification Ordered   5  Meloxicam 7 5 MG Oral Tablet; TAKE 1 TABLET TWICE DAILY WITH FOOD; Therapy: 96CTU2359 to (Evaluate:70Atu1036)  Requested for: 09YEG3311; Last   Rx:2017 Ordered   6  Vanicream External Cream; APPLY SPARINGLY TO AFFECTED AREA(S) TWICE DAILY;    Therapy: 22HAD4689 to (Last Rx:06Jun2017)  Requested for: 40UTP3528; Status: ACTIVE   - Transmit to Pharmacy - Awaiting Verification Ordered   7  ZyrTEC Allergy 10 MG Oral Tablet; Take 1 tablet daily; Therapy: (Recorded:33Bbq1063) to Recorded    The medication list was reviewed and updated today  Allergies    1  Cipro SOLN    2  Seasonal    Vitals  Signs   Recorded: 62TEO6893 08:46AM   Heart Rate: 64  Systolic: 786  Diastolic: 84  Height: 5 ft 4 in  Weight: 227 lb 4 00 oz  BMI Calculated: 39 01  BSA Calculated: 2 08    Physical Exam    Constitutional   General appearance: Abnormal   obese  Lumbar/Sacral Spine examination demonstrates Lumbosacral Spine:   Evaluation of Muscle Stretch Reflexes on the right side demonstrates 1/4 Hamstring Reflex, 1/4 Knee Jerk Reflex and 1/4 Ankle Jerk Reflex  Evaluation of Muscle Stretch Reflexes on the left side demonstrates 1/4 Hamstring Reflex, 1/4 Knee Jerk Reflex and 1/4 Ankle Jerk Reflex  Special Tests: negative Straight Leg Raise on right and negative Straight Leg Raise on left  Results/Data    MRI Review HNP slightly smaller than prior        Future Appointments    Date/Time Provider Specialty Site   06/13/2017 02:40 PM Mathew Mendez 6 PCP     Signatures   Electronically signed by : Mariana Orourke DO; Jun 8 2017  9:04AM EST                       (Author)

## 2018-01-12 NOTE — MISCELLANEOUS
Message   Recorded as Task   Date: 12/20/2016 09:32 AM, Created By: Lalitha Ortega   Task Name: Follow Up   Assigned To: SARAH simpson procedure,Team   Regarding Patient: Monisha Herrmann, Status: Active   CommentZelma Marus - 20 Dec 2016 9:32 AM     TASK CREATED  pt is S/P B/L L5 TFESI # 2 12/13/2016 by Dr Anali Rodriguez   no pain diary   no f/u   Mary Almanzar - 20 Dec 2016 9:41 AM     TASK REASSIGNED: Previously Assigned To SARAH Haddad - 20 Dec 2016 9:42 AM     TASK EDITED  1st attempt lm for to call to f/u   Lalitha Ortega - 20 Dec 2016 9:42 AM     TASK IN PROGRESS   Mary Almanzar - 20 Dec 2016 11:04 AM     TASK EDITED  spoke to pt she got 50% relief from inj her pain is a 4 no redness or swelling   Fortunato Sykes - 20 Dec 2016 11:21 AM     TASK REPLIED TO: Previously Assigned To Ld Vick md aware   will await Dr Tucker Valverde    1  Allergic rhinitis (477 9) (J30 9)   2  Cellulitis of hand (682 4) (L03 119)   3  Eczema (692 9) (L30 9)   4  Intervertebral disc disorders with radiculopathy, lumbosacral region (724 4) (M51 17)   5  Lumbar radiculopathy (724 4) (M54 16)    Current Meds   1  Benadryl Allergy 25 MG Oral Tablet; Therapy: (Recorded:99Bkn0812) to Recorded   2  Ibuprofen 800 MG Oral Tablet; Therapy: (Durene Countess) to Recorded   3  Meloxicam 7 5 MG Oral Tablet; TAKE 1 TABLET TWICE DAILY WITH FOOD; Therapy: 10XIS8751 to (95 460256)  Requested for: 47YHD9674; Last   Rx:49Yor3230 Ordered   4  ZyrTEC Allergy 10 MG Oral Capsule; Therapy: (Recorded:88Fcg3180) to Recorded    Allergies    1   Cipro SOLN    Signatures   Electronically signed by : Tri Henry, ; Dec 20 2016 11:32AM EST                       (Author)

## 2018-01-12 NOTE — MISCELLANEOUS
Message   Recorded as Task   Date: 11/08/2016 01:07 PM, Created By: Yanet Friedman   Task Name: Follow Up   Assigned To: Sravani Chadwick   Regarding Patient: Maria Dolores Bosch, Status: In Progress   CommentCalista Perez - 08 Nov 2016 1:07 PM     TASK CREATED  pt is S/P 11/01/2016 B/L L5 TFESI by Dr Alfredo Montalvo   no pain diary   no f/u scheduled   Mary Almanzar - 08 Nov 2016 1:54 PM     TASK EDITED  1st attempt cant leave message on the numbers listed   Mary Almanzar - 08 Nov 2016 1:54 PM     TASK IN PROGRESS   Yanet Friedman - 09 Nov 2016 8:58 AM     TASK EDITED  2nd attempt call both number lm message on one plaese send cant reach letter   Letter sent      Active Problems    1  Allergic rhinitis (477 9) (J30 9)   2  Cellulitis of hand (682 4) (L03 119)   3  Eczema (692 9) (L30 9)   4  Intervertebral disc disorders with radiculopathy, lumbosacral region (724 4) (M51 17)   5  Lumbar radiculopathy (724 4) (M54 16)    Current Meds   1  Benadryl Allergy 25 MG Oral Tablet; Therapy: (Recorded:71Odr2253) to Recorded   2  Ibuprofen 800 MG Oral Tablet; Therapy: ([phone]) to Recorded   3  Meloxicam 7 5 MG Oral Tablet; TAKE 1 TABLET TWICE DAILY WITH FOOD; Therapy: 79HVY2086 to (72 [phone])  Requested for: 61WCL6180; Last   Rx:24Zik2730 Ordered   4  ZyrTEC Allergy 10 MG Oral Capsule; Therapy: (Recorded:15Owo0765) to Recorded    Allergies    1   Cipro SOLN    Signatures   Electronically signed by : Jose Guadalupe Meza, ; Nov 10 2016 10:57AM EST                       (Author)

## 2018-01-12 NOTE — PROGRESS NOTES
Assessment    1  Intervertebral disc disorders with radiculopathy, lumbosacral region (724 4) (M51 17)   2  Lumbar radiculopathy (724 4) (M54 16)   3  Work related injury (959 9) (Y99 0)   4  Spinal stenosis of lumbar region (724 02) (M48 06)    Plan  Work related injury    · [address] God[address] MD, Ulysses Wyman  (Neurosurgery) Co-Management  *  Status: Active  Requested for:  56LCM2399  Care Summary provided  : Yes   · Follow-up visit in 6 weeks Evaluation and Treatment  Follow-up  Status: Hold For -  Scheduling  Requested for: 53SBH5636    Discussion/Summary  Impression: low back pain, disc herniation, radiculopathy and spinal stenosis  Currently, the condition is worsening  There are no changes in medication management  Treatment plan includes activity modification, exercise regimen and Neurosurgery consult  Patient discussion: discussed with the patient  Chief Complaint  Work related LBP   1/19 follow up   accompanied by   2/17 follow up accompanied by   3/16 f/u accompanied by CM   4/6 follow after PTx accompanied by CM  5/4 follow up for 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 last two weeks   Has no further caer planned thru PM    2/17 working with restrictions at usual employer   in PT   per PT note of 2/15 NOT at level of job capabiltiy but liely am push W/ C    3/16 working full time with restrictions   in PT unable to tolerate shelve level lift of 40 # due to "belly pain" per CM carrier is "waiting" for outcome of PTx  Neeta LONGO physician is waiting for records  Currently having right leg Sx    4/6 follow up for [facility]   has had subjective pain with 40 AND 30 # lifts  Has not been RTW by carrier  Has had TWO LESI by Pain Medicine  5/4 follow up last LESI was December 2016  Has been on HEP since last visit feels worse  Has pain with sneeze  Review of Systems    Musculoskeletal: pain patrter is right lateral leg to great toe , but as noted in HPI  Active Problems    1  Allergic rhinitis (477 9) (J30 9)   2  Cellulitis of hand (682 4) (L03 119)   3  Eczema (692 9) (L30 9)   4  Intervertebral disc disorders with radiculopathy, lumbosacral region (724 4) (M51 17)   5  Lumbar radiculopathy (724 4) (M54 16)   6  Work related injury (959 9) (Y99 0)    Past Medical History    1  Eczema (692 9) (L30 9)    The active problems and past medical history were reviewed and updated today  Surgical History    1  History of Back Surgery    Family History  Father    1  Family history of Coronary Artery Disease (V17 49)  Family History    2  Family history of Asthma (V17 5)    Social History    · Being A Social Drinker   · Current Every Day Smoker (305 1)    Current Meds   1  Benadryl Allergy 25 MG Oral Tablet; Therapy: (Recorded:35Tpk8048) to Recorded   2  Ibuprofen 800 MG Oral Tablet; Therapy: (Bayamon Closs) to Recorded   3  Meloxicam 7 5 MG Oral Tablet; TAKE 1 TABLET TWICE DAILY WITH FOOD; Therapy: 91GDL5693 to (Evaluate:20Dft5213)  Requested for: 53UXP6817; Last   Rx:16Mar2017 Ordered   4  Meloxicam 7 5 MG Oral Tablet; TAKE 1 TABLET TWICE DAILY WITH FOOD;    Therapy: 11EQH0301 to (05 12 73 93 30)  Requested for: 95LOJ2254; Last   Rx:50Gmq9105 Ordered   5  ZyrTEC Allergy 10 MG Oral Capsule; Therapy: (Recorded:67Oih4308) to Recorded    Allergies    1  Cipro SOLN    Vitals  Signs   Recorded: 26JMN0738 08:48AM   Heart Rate: 76  Systolic: 695  Diastolic: 80  Height: 5 ft 4 in  Weight: 226 lb   BMI Calculated: 38 79  BSA Calculated: 2 07    Physical Exam    Constitutional   General appearance: Abnormal   overweight  Musculoskeletal   Gait and station: Normal     Lumbar/Sacral Spine examination demonstrates Lumbosacral Spine:   Evaluation of Muscle Stretch Reflexes on the right side demonstrates 1/4 Ankle Jerk Reflex, but 2/4 Hamstring Reflex, 2/4 Knee Jerk Reflex and negative right ankle clonus  Evaluation of Muscle Stretch Reflexes on the left side demonstrates negative left ankle clonus, but 2/4 Hamstring Reflex and 2/4 Knee Jerk Reflex  Results/Data    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

## 2018-01-13 VITALS
WEIGHT: 228 LBS | HEIGHT: 64 IN | DIASTOLIC BLOOD PRESSURE: 80 MMHG | BODY MASS INDEX: 38.93 KG/M2 | HEART RATE: 84 BPM | SYSTOLIC BLOOD PRESSURE: 120 MMHG

## 2018-01-13 VITALS
WEIGHT: 229.28 LBS | HEART RATE: 76 BPM | SYSTOLIC BLOOD PRESSURE: 112 MMHG | DIASTOLIC BLOOD PRESSURE: 82 MMHG | BODY MASS INDEX: 39.14 KG/M2 | HEIGHT: 64 IN | TEMPERATURE: 98.8 F

## 2018-01-13 VITALS
DIASTOLIC BLOOD PRESSURE: 86 MMHG | SYSTOLIC BLOOD PRESSURE: 110 MMHG | HEIGHT: 64 IN | BODY MASS INDEX: 38.41 KG/M2 | WEIGHT: 225 LBS | HEART RATE: 104 BPM

## 2018-01-13 VITALS
DIASTOLIC BLOOD PRESSURE: 88 MMHG | WEIGHT: 224 LBS | SYSTOLIC BLOOD PRESSURE: 122 MMHG | HEIGHT: 64 IN | BODY MASS INDEX: 38.24 KG/M2 | HEART RATE: 120 BPM

## 2018-01-13 NOTE — MISCELLANEOUS
Message  Return to work or school:   Mechelle Rivero is under my professional care  She was seen in my office on Dec 22nd   She is able to return to work on  prn    She is not able to participate in sports or gym class  Weight Bearing Status: Full Weight-Bearing  No lifting / pushing / pulling > 20 #          Signatures   Electronically signed by : Price Matias DO; Dec 22 2016 10:58AM EST                       (Author)

## 2018-01-13 NOTE — CONSULTS
Assessment    1  Lumbar radiculopathy (724 4) (M54 16)   2  Chronic pain disorder (338 4) (G89 4)    Plan  Chronic pain disorder, Lumbar radiculopathy    · Follow-up PRN Evaluation and Treatment  Follow-up  Status: Complete  Done:  [id number]   Ordered; For: Chronic pain disorder, Lumbar radiculopathy; Ordered By: Toby Carter Performed:  Due: [id number]    Discussion/Summary    24-year-old woman with chronic lower back and right-sided leg pain  I reviewed her history, physical examination and imaging in detail with her today  A total of 30 minutes is spent with the patient which more and 50% was spent in direct counseling and coordination of care  Over 3 minutes is spent discussing impact of smoking on degenerative disc disease  There number of smoking cessation strategies available to her  She is actively trying to quit he'll continue to follow with her PCP for this  Her back pain seems musculoskeletal in nature  Her right leg pain is somewhat concerning for neuropathic pain  Overall, activities at work, including lifting and pulling patients seems to exacerbate her symptoms  Given the improvement on her MRI, I would recommend she continue with nonsurgical pain management strategies  She is also taking business administration courses to allow her to transition to more administrative or supervisory position  Hopefully the combination of appropriate pain medication, possible additional injections and lifestyle modification will be helpful  She may wish discuss a course of membranes stabilizing agents with her PCP or pain specialist     All her questions were answered to her satisfaction  At this point in time, no plans for further follow-up through this office  She is in agreement with this course of action  The patient has the current Goals: Improved pain control  The patent has the current Barriers: Obesity  Tobacco use  Possible chronic neuropathic pain  Patient is able to Self-Care       Self Referrals: No      Chief Complaint  New patient referred by Dr Reta Ortiz for low back pain with radiculopathy      History of Present Illness  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  The symptoms have persisted since  She describes midline lower back pain which can radiate great across her back which is mild, aching and sore  Her back will feel warm to touch  She is unable to put any numerical value at the pain  The pain seems to increase with sitting or going from sitting to standing and with increased activity  She finds it very difficult to move patients at work  The pain will radiate into the outer thigh, calf and into her outer toe  She describes this as an aching sensation which seems to increase with walking  She denies any weakness and only has occasional numbness  She'll occasionally have symptoms in the left leg  She denies any difficulties with bowel and bladder function, aside from one episode of urinary leaking after voiding, or change in perineal sensation  Flexeril, ibuprofen, meloxicam and prednisone have not been helpful with the pain  She underwent 2 lumbar epidural steroid injections which provided some improvement in her symptoms  She's been participating in physical therapy as well  She presents with an MRI of the lumbar spine and to discuss her surgical options  Review of Systems    Constitutional: No fever, no chills, feels well, no tiredness, no recent weight gain or weight loss  Eyes: No complaints of eye pain, no red eyes, no eyesight problems, no discharge, no dry eyes, no itching of eyes  ENT: no complaints of earache, no loss of hearing, no nose bleeds, no nasal discharge, no sore throat, no hoarseness  Cardiovascular: No complaints of slow heart rate, no fast heart rate, no chest pain, no palpitations, no leg claudication, no lower extremity edema     Respiratory: No complaints of shortness of breath, no wheezing, no cough, no SOB on exertion, no orthopnea, no PND  Gastrointestinal: No complaints of abdominal pain, no constipation, no nausea or vomiting, no diarrhea, no bloody stools  Genitourinary: incontinence (Frequency and urgency), but no dysuria, no pelvic pain, no vaginal discharge, no dysmenorrhea and no unexplained vaginal bleeding  Musculoskeletal: limb pain (Right leg, foot), but no joint swelling, no myalgias, no joint stiffness and no limb swelling    The patient presents with complaints of arthralgias (Lower back middle/center radiates into right buttock down right leg, foot)  Integumentary: No complaints of skin rash or lesions, no itching, no skin wounds, no breast pain or lump  Neurological: numbness, tingling, limb weakness, difficulty walking and Across lower back and right leg, but no confusion, no dizziness and no fainting    The patient presents with complaints of headache (Hx of migraines)  Psychiatric: sleep disturbances (Due to pain) and depression, but not suicidal, no anxiety, no personality change and no emotional problems  Endocrine: No complaints of proptosis, no hot flashes, no muscle weakness, no deepening of the voice, no feelings of weakness  Hematologic/Lymphatic: no swollen glands, no tendency for easy bleeding and no swollen glands in the neck    The patient presents with complaints of a tendency for easy bruising (Anemia)  ROS reviewed  Active Problems    1  Allergic rhinitis (477 9) (J30 9)   2  Cellulitis of hand (682 4) (L03 119)   3  Eczema (692 9) (L30 9)   4  Intervertebral disc disorders with radiculopathy, lumbosacral region (724 4) (M51 17)   5  Lumbar radiculopathy (724 4) (M54 16)   6  Spinal stenosis of lumbar region (724 02) (M48 06)   7  Work related injury (959 9) (Y99 0)    Past Medical History    1  Eczema (692 9) (L30 9)    The active problems and past medical history were reviewed and updated today        Surgical History 1  History of Back Surgery    The surgical history was reviewed and updated today  Family History  Mother    1  Family history of hypertension (V17 49) (Z82 49)  Father    2  Family history of Coronary Artery Disease (V17 49)  Sister    3  Family history of asthma (V17 5) (Z82 5)  Brother    4  Family history of asthma (V17 5) (Z82 5)  Family History    5  Family history of Asthma (V17 5)    The family history was reviewed and updated today  Social History    · Being A Social Drinker   · Current Every Day Smoker (305 1)   · Has 1 child   · High school or GED   · Lives with mother (single parent)   · Occupation  The social history was reviewed and updated today  Current Meds   1  Advil 200 MG Oral Capsule; TAKE 2 CAPSULE Daily PRN; Therapy: (Recorded:32Swo4061) to Recorded   2  Benadryl 25 MG CAPS; TAKE 1 CAPSULE AT BEDTIME; Therapy: (Recorded:16May2017) to Recorded   3  Meloxicam 7 5 MG Oral Tablet; TAKE 1 TABLET TWICE DAILY WITH FOOD; Therapy: 90GFH2664 to (Evaluate:81Atp6478)  Requested for: 92FWH8524; Last   Rx:16Mar2017 Ordered   4  ZyrTEC Allergy 10 MG Oral Tablet; Take 1 tablet daily; Therapy: (Recorded:16May2017) to Recorded    The medication list was reviewed and updated today  Allergies    1  Cipro SOLN    Vitals  Vital Signs    Recorded: 64JSE2301 08:29AM   Temperature 97 1 F   Heart Rate 80   Respiration 18   Systolic 140   Diastolic 80   Height 5 ft 4 in   Weight 231 lb 4 oz   BMI Calculated 39 69   BSA Calculated 2 08   Pain Scale 8     Physical Exam     Constitutional   General appearance: Abnormal   obese  Respiratory Auscultation of lungs: Clear to auscultation bilaterally  Cardiovascular Auscultation of heart: No extra sounds  Musculo: Spine   Lumbar/Sacral Spine: Abnormal  Lumbosacral Spine: Appearance: Normal  Tenderness: None  Flexion was restricted and was painful  Extension was not restricted and was painful  Skin warm and dry   No rashes, lesions or ecchymosis  Neurologic - Mental Status: Alert and Oriented x3  Mood and affect: Affect is normal   Memory is intact  Motor System - Lower Extremities: 5/5 power in lower extremities  Muscle tone: Normal bilaterally  Muscle Bulk: Normal bilaterally  Reflexes:   Reflexes: Abnormal   Deep tendon reflexes: 1+ right patella, 1+ left patella, 0 right ankle jerk and 0 left ankle jerkno ankle clonus on the right and no ankle clonus on the left  Superficial/Primitive Reflexes: Babinski reflex absent on the right and Babinski reflex absent on the left  Coordination: Finger to nose was normal    Sensory: Sensation grossly intact to light touch  Gait and Station: Able to heal toe gait and Romberg negative  Results/Data    I personally reviewed the mri in detail with the patient  MRI Review MRI lumbar spine without contrast dated March 23, 2017  Comparison to previous study from September 2016  Normal lumbar lordosis  Mild degenerative changes throughout the lumbar spine  Central and right paracentral disc herniation at L5-S1, improved compared to previous imaging  There is some contact with the traversing S1 nerve root but overall this is improved compared to the previous study  No other areas of significant neural compression  Intrathecal contents unremarkable        Signatures   Electronically signed by : TAYLOR Caldera ; May 16 2017 10:03AM EST

## 2018-01-14 VITALS
BODY MASS INDEX: 38.8 KG/M2 | HEIGHT: 64 IN | WEIGHT: 227.25 LBS | SYSTOLIC BLOOD PRESSURE: 120 MMHG | DIASTOLIC BLOOD PRESSURE: 84 MMHG | HEART RATE: 64 BPM

## 2018-01-14 VITALS
WEIGHT: 229 LBS | SYSTOLIC BLOOD PRESSURE: 114 MMHG | TEMPERATURE: 98.8 F | RESPIRATION RATE: 18 BRPM | HEART RATE: 82 BPM | BODY MASS INDEX: 39.09 KG/M2 | HEIGHT: 64 IN | OXYGEN SATURATION: 98 % | DIASTOLIC BLOOD PRESSURE: 78 MMHG

## 2018-01-14 VITALS
DIASTOLIC BLOOD PRESSURE: 80 MMHG | BODY MASS INDEX: 38.58 KG/M2 | WEIGHT: 226 LBS | HEART RATE: 76 BPM | HEIGHT: 64 IN | SYSTOLIC BLOOD PRESSURE: 116 MMHG

## 2018-01-14 VITALS
DIASTOLIC BLOOD PRESSURE: 80 MMHG | SYSTOLIC BLOOD PRESSURE: 118 MMHG | RESPIRATION RATE: 18 BRPM | TEMPERATURE: 97.1 F | WEIGHT: 231.25 LBS | HEIGHT: 64 IN | HEART RATE: 80 BPM | BODY MASS INDEX: 39.48 KG/M2

## 2018-01-14 VITALS
HEIGHT: 64 IN | DIASTOLIC BLOOD PRESSURE: 70 MMHG | SYSTOLIC BLOOD PRESSURE: 110 MMHG | BODY MASS INDEX: 38.41 KG/M2 | HEART RATE: 88 BPM | WEIGHT: 225 LBS

## 2018-01-14 NOTE — MISCELLANEOUS
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

## 2018-01-14 NOTE — MISCELLANEOUS
Message  Return to work or school:   Noel Street is under my professional care  She was seen in my office on May 4th   She is able to return to work on  Weight Bearing Status: Full Weight-Bearing  Continue 30 # max weight limit          Signatures   Electronically signed by : Viviana Garcia DO; May  4 2017  9:06AM EST                       (Author)

## 2018-01-14 NOTE — MISCELLANEOUS
Message  Return to work or school:   Lynnae Boeck is under my professional care  She was seen in my office on Sept 29th   She is able to return to work on  as scheduled    She is not able to participate in sports or gym class  Weight Bearing Status: Full Weight-Bearing  No lifting, carrying, pushing > 20#, no bending, occassional squat/twist, no kneel or crawl          Signatures   Electronically signed by : Melita Clinton DO; Sep 29 2016  9:19AM EST                       (Author)

## 2018-01-15 NOTE — PROGRESS NOTES
Assessment    1  Intervertebral disc disorders with radiculopathy, lumbosacral region (724 4) (M51 17)   2  Work related injury (959 9) (Y99 0)    Plan  Work related injury    · Follow-up Visit in 4 Weeks Evaluation and Treatment  Follow-up  Status: Hold For -  Scheduling  Requested for: 06Apr2017   · Physical Therapy Referral Other Co-Management  FCE not indicated  Review HEOP fro extension bias spinal program (ie Miguel) and may DC  NO lunges  or squats  Status: Active  Requested for: 06Apr2017  are Referring to a non- Preferred Provider : Established Patient  Care Summary provided  : Yes    Discussion/Summary  Impression: low back pain, disc herniation and spinal stenosis  Currently, the condition is unchanged  There are no changes in medication management  Treatment plan includes exercise regimen and physical therapy  Patient discussion: discussed with the patient  Chief Complaint  Work related LBP   1/19 follow up   accompanied by   2/17 follow up accompanied by   3/16 f/u accompanied by CM   4/6 follow after PTx accompanied by CM      History of Present Illness  33 t yo female with discogenic LBP   has had two LESIs   has phone follow up pending next week   states no pain during daytime if active   notes pain with recumbant position at HS, local to , feels like it moves   no PT since October due to school schedule  Is on per belkis care at [facility]   has restrictions  Has had prior IDET 2012 (Naftulin) Taking no meds for LBP  Feels 70 % improvement with LESIs  Had been working with restriction untill September  Has DONTA with Carlos Kendall in January 1/19 had DONTA and released to full duty   has yet to be sent official  to usual occupation  last worked September  Abeba Skipper on per belkis now   reports URI since Decc seen at ED  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  Currently today some left lateral hip and buttock pain  Attends PT at [facility] last two weeks   Has no further caer planned thru PM    2/17 working with restrictions at usual employer   in PT   per PT note of 2/15 NOT at level of job capabiltiy but liely am push W/ C    3/16 working full time with restrictions   in PT unable to tolerate shelve level lift of 40 # due to "belly pain" per CM carrier is "waiting" for outcome of PTx  Elgin Miles Elgin Miles DONTA physician is waiting for records  Currently having right leg Sx    4/6 follow up for [facility]   has had subjective pain with 40 AND 30 # lifts  Has not been RTW by carrier  Has had TWO LESI by Pain Medicine  Review of Systems    Neurological: intermittent right arch and digit 5 numbness  and numbness  Other Symptoms: currently right sided hip pain  ROS reviewed  Active Problems    1  Allergic rhinitis (477 9) (J30 9)   2  Cellulitis of hand (682 4) (L03 119)   3  Eczema (692 9) (L30 9)   4  Intervertebral disc disorders with radiculopathy, lumbosacral region (724 4) (M51 17)   5  Lumbar radiculopathy (724 4) (M54 16)   6  Work related injury (959 9) (Y99 0)    Past Medical History    1  Eczema (692 9) (L30 9)    The active problems and past medical history were reviewed and updated today  Surgical History    1  History of Back Surgery    The surgical history was reviewed and updated today  Family History  Father    1  Family history of Coronary Artery Disease (V17 49)  Family History    2  Family history of Asthma (V17 5)    The family history was reviewed and updated today  Social History    · Being A Social Drinker   · Current Every Day Smoker (305 1)    Current Meds   1  Benadryl Allergy 25 MG Oral Tablet; Therapy: (Recorded:72Vlr5521) to Recorded   2  Ibuprofen 800 MG Oral Tablet; Therapy: (Miachel Saint Landry) to Recorded   3  Meloxicam 7 5 MG Oral Tablet; TAKE 1 TABLET TWICE DAILY WITH FOOD; Therapy: 06CLE9627 to (Evaluate:74Xtg3004)  Requested for: 18OTK9590; Last   Rx:16Mar2017 Ordered   4   Meloxicam 7 5 MG Oral Tablet; TAKE 1 TABLET TWICE DAILY WITH FOOD; Therapy: 83ORJ4337 to (Kimberly Harmon)  Requested for: 98GWL3576; Last   Rx:38Tnm8199 Ordered   5  ZyrTEC Allergy 10 MG Oral Capsule; Therapy: (Recorded:99Wxd5618) to Recorded    The medication list was reviewed and updated today  Allergies    1  Cipro SOLN    Vitals  Signs   Recorded: 31ZTA9624 08:44AM   Heart Rate: 88  Systolic: 527  Diastolic: 70  Height: 5 ft 4 in  Weight: 225 lb   BMI Calculated: 38 62  BSA Calculated: 2 07    Physical Exam    Constitutional   General appearance: Abnormal   overweight  Lumbar/Sacral Spine examination demonstrates  One Danae of exaageration  Lumbosacral Spine:   Evaluation of Muscle Stretch Reflexes on the right side demonstrates 1/4 Hamstring Reflex, 1/4 Knee Jerk Reflex and 1/4 Ankle Jerk Reflex  Evaluation of Muscle Stretch Reflexes on the left side demonstrates 1/4 Hamstring Reflex, 1/4 Knee Jerk Reflex and 1/4 Ankle Jerk Reflex  Results/Data  I personally reviewed the films/images/results in the office today  My interpretation follows  MRI Review L-5/S-1 disc protrusion is smaller but also has congenital short pedicles  Diagnostic Review PT advising FCE and work conditioning   but pt  unable to tolerate 30 # lifts  Signatures   Electronically signed by : Christiano Salas DO;  Apr 6 2017  9:09AM EST                       (Author)

## 2018-01-16 NOTE — MISCELLANEOUS
Message  Return to work or school:   Magui Alex is under my professional care  She was seen in my office on 11/18/16   She is able to return to work on  as scheduled    She is not able to participate in sports or gym class  Ni lifting,pushing, pulling > 20 #, no bending, twisting of trunk          Signatures   Electronically signed by : Tiny Orellana DO; Nov 18 2016  1:38PM EST                       (Author)

## 2018-01-16 NOTE — PROGRESS NOTES
Assessment    1  Intervertebral disc disorders with radiculopathy, lumbosacral region (724 4) (M51 17)   2  Lumbar radiculopathy (724 4) (M54 16)    Plan  Intervertebral disc disorders with radiculopathy, lumbosacral region    · Follow-up Visit in 4 Weeks Evaluation and Treatment  Follow-up  Status: Hold For -  Scheduling  Requested for: 84Lkp2632  Lumbar radiculopathy    · Physical Therapy Referral Other Physician Referral  Consult DDD and rseolvimng  discogenic pain   need core and spinal flexibility and strenghtening  Status: Hold For -  Scheduling  Requested for: 72Ocu8831  are Referring to a non- Preferred Provider : Established Patient  Care Summary provided  : Yes    Discussion/Summary  Impression: low back pain, disc herniation and radiculopathy  Currently, the condition is improving  There are no changes in medication management  Treatment plan includes activity modification and physical therapy  Patient discussion: discussed with the patient, D/W , and requested copy of DONTA report  Chief Complaint  Work related LBP  History of Present Illness  33 t yo female with discogenic LBP   has had two LESIs   has phone follow up pending next week   states no pain during daytime if active   notes pain with recumbant position at HS, local to , feels like it moves   no PT since October due to school schedule  Is on per belkis care at [facility]   has restrictions  Has had prior IDET 2012 (Naftulin) Taking no meds for LBP  Feels 70 % improvement with LESIs  Had been working with restriction untill September  Has DONTA with Georgie Pantoja in January  Active Problems    1  Allergic rhinitis (477 9) (J30 9)   2  Cellulitis of hand (682 4) (L03 119)   3  Eczema (692 9) (L30 9)   4  Intervertebral disc disorders with radiculopathy, lumbosacral region (724 4) (M51 17)   5  Lumbar radiculopathy (724 4) (M54 16)    Past Medical History    1   Eczema (692 9) (L30 9)    The active problems and past medical history were reviewed and updated today  Surgical History    1  History of Back Surgery    The surgical history was reviewed and updated today  Family History  Father    1  Family history of Coronary Artery Disease (V17 49)  Family History    2  Family history of Asthma (V17 5)    The family history was reviewed and updated today  Social History    · Being A Social Drinker   · Current Every Day Smoker (305 1)  The social history was reviewed and is unchanged  Current Meds   1  Benadryl Allergy 25 MG Oral Tablet; Therapy: (Recorded:15Ava3032) to Recorded   2  Ibuprofen 800 MG Oral Tablet; Therapy: (Giselle Deutsch) to Recorded   3  Meloxicam 7 5 MG Oral Tablet; TAKE 1 TABLET TWICE DAILY WITH FOOD; Therapy: 03TDL7841 to (Hedda Flax)  Requested for: 73ZHC3337; Last   Rx:09Sgd8743 Ordered   4  ZyrTEC Allergy 10 MG Oral Capsule; Therapy: (Recorded:46Zkd8897) to Recorded    Allergies    1  Cipro SOLN    Vitals  Signs   Recorded: 26Qdh5756 10:35AM   Heart Rate: 80  Systolic: 258  Diastolic: 90  Height: 5 ft 4 in  Weight: 222 lb   BMI Calculated: 38 11  BSA Calculated: 2 06    Physical Exam        Lumbar/Sacral Spine examination demonstrates Lumbosacral Spine:   Evaluation of Muscle Stretch Reflexes on the right side demonstrates 1/4 Hamstring Reflex, 1/4 Knee Jerk Reflex and 1/4 Ankle Jerk Reflex  Evaluation of Muscle Stretch Reflexes on the left side demonstrates 1/4 Hamstring Reflex, 1/4 Knee Jerk Reflex and 1/4 Ankle Jerk Reflex  Results/Data    Diagnostic Review PT D/C summary noted not seen since October        Signatures   Electronically signed by : Efren Islas DO; Dec 22 2016 10:56AM EST                       (Author)

## 2018-01-16 NOTE — MISCELLANEOUS
Message  Return to work or school:   Magdi Carroll is under my professional care  She was seen in my office on Oct 12th, '17       No lift / carry / push / pull > 30 #          Signatures   Electronically signed by : Mariana Orourke DO; Oct 12 2017 10:42AM EST                       (Author)

## 2018-01-17 NOTE — MISCELLANEOUS
Message  Return to work or school:   Mechelle Rivero is under my professional care  She was seen in my office on March 16th   She is able to return to work on  Keep 30 # restriction          Signatures   Electronically signed by : Price Matias DO; Mar 16 2017  9:18AM EST                       (Author)

## 2018-01-17 NOTE — RESULT NOTES
Message   Recorded as Task   Date: 11/08/2016 01:07 PM, Created By: Reynaldo Cooney   Task Name: Follow Up   Assigned To: Jcarlos Izquierdo   Regarding Patient: Maggy Drew, Status: In Progress   DaniloAndie Rios - 08 Nov 2016 1:07 PM     TASK CREATED  pt is S/P 11/01/2016 B/L L5 TFESI by Dr Cassie Zafar   no pain diary   no f/u scheduled   Mary Almanzar - 08 Nov 2016 1:54 PM     TASK EDITED  1st attempt cant leave message on the numbers listed   Mary Almanzar - 08 Nov 2016 1:54 PM     TASK IN PROGRESS   Reynaldo Cooney - 09 Nov 2016 8:58 AM     TASK EDITED  2nd attempt call both number lm message on one plaese send cant reach letter   Sravani Chadwick - 10 Nov 2016 10:57 AM     TASK COMPLETED   Yandy Haque - 18 Nov 2016 2:02 PM     TASK REACTIVATED   Kat Fleimng - 18 Nov 2016 2:04 PM     TASK EDITED  Pt presented to [facility] requesting to speak to someone in regards to her inj, stating no one ever called  Pt reports ~50% relief from inj, current level of pa in 4-5, prior to inj 8-9/10  Pt reports she is still having weakness when lifting things  Pt would be interested in a repeat inj  Pt can be reached at [phone] or mom's cell [phone]  Zoe Clinton - 18 Nov 2016 2:30 PM     TASK REPLIED TO: Previously Assigned To [facility] clinical,Team   ok to schedule repeat injection   Radha Peters - 18 Nov 2016 4:29 PM     TASK REASSIGNED: Previously Assigned To Kilo Godfrey - 25 Nov 2016 10:31 AM     TASK REASSIGNED: Previously Assigned To [facility] sched,Team   Radha Peters - 25 Nov 2016 11:01 AM     TASK EDITED  Left vm on pt's phone to please c/b to have a repeat inj scheduled  Attempted to reach pt on her mom's cell as requested-> pt's mom said she was not there but will give her a mesage to c/b to schedule repeat inj  [facility] Screen triage number with prompts provided     Radha Peters - 25 Nov 2016 11:01 AM     TASK IN PROGRESS Kareen Wilde - 30 Nov 2016 9:50 AM     TASK EDITED  just incase she calls you to schedule  A heads up  Thanks   Kt Keating - 02 Dec 2016 2:41 PM     TASK REPLIED TO: Previously Assigned To [facility] sched,Team  LMOM x2 for pt to cb     Adriana Mesa - 06 Dec 2016 8:07 AM     TASK REASSIGNED: Previously Assigned To [facility] Star coronado Laura - 06 Dec 2016 8:14 AM     TASK EDITED  Left message for patient to call back   eTjas Herrera - 06 Dec 2016 8:31 AM     TASK EDITED  S/W patient - patient scheduled for Tuesday, Dec 13 at [facility] with Dr Karmen Medina - patient advised nothing to eat or drink 1 hour prior to procedure but encouraged to have a light breakfast - patient denies any blood thinners/abx's - patient also advised to arrange for  - patient aware and agreed        Signatures   Electronically signed by : Ahmet Paul, ; Dec  6 2016  8:31AM EST                       (Author)

## 2018-01-17 NOTE — PROGRESS NOTES
Assessment    1  Schmorl's node (722 30) (M51 9)   2  Intervertebral disc disorders with radiculopathy, lumbosacral region (724 4) (M51 17)   3  Work related injury (959 9) (Y99 0)    Plan  Work related injury    · Nabumetone 500 MG Oral Tablet; TAKE 1 TABLET EVERY 12 HOURS AS NEEDED   · 1 - Jennifer Bernardo MD, Suzie MCKEON (Pain Management) Co-Management  eval for LESI for acute  Schmorl's node at L-5 S-1   last LESI Dec 2016  Status: Active  Requested for:  78KVQ1407  Care Summary provided  : Yes   · Follow-up visit in 6 weeks Evaluation and Treatment  Follow-up  Status: Complete  Done:  91HUT8370    Discussion/Summary  Impression: low back pain and riht L-5/ S-1  disc herniation  Medication changes are as documented in orders  Treatment plan includes activity modification and pain medicine consultation  Patient discussion: discussed with the patient,   Chief Complaint  Work related LBP   1/19 follow up   accompanied by   2/17 follow up accompanied by   3/16 f/u accompanied by CM   4/6 follow after PTx accompanied by CM  5/4 [facility] up for [facility]  6/8 WRI f/u  8/17 seen two months ago for work injury  10/12 fu for work injury  11/7 fu for work injury  accompanied by Kae Abraham from [employer]  History of Present Illness  33 t yo female with discogenic LBP   has had two LESIs   has phone follow up pending next week   states no pain during daytime if active   notes pain with recumbant position at HS, local to , feels like it moves   no PT since October due to school schedule  Is on per belkis care at [facility]   has restrictions  Has had prior IDET 2012 (Naftulin) Taking no meds for LBP  Feels 70 % improvement with LESIs  Had been working with restriction untill September  Has DONTA with Carlos Kendall in January 1/19 had DONTA and released to full duty   has yet to be sent official  to usual occupation  last worked September  Abeba Skipper on per belkis now   reports URI since Decc seen at ED   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  Currently today some left lateral hip and buttock pain  Attends PT at [facility] last two weeks  Has no further caer planned thru PM    2/17 working with restrictions at usual employer   in PT   per PT note of 2/15 NOT at level of job capabiltiy but liely am push W/ C    3/16 working full time with restrictions   in PT unable to tolerate shelve level lift of 40 # due to "belly pain" per CM carrier is "waiting" for outcome of PTx  Zulma LONGO physician is waiting for records  Currently having right leg Sx    4/6 follow up [facility]   has had subjective pain with 40 AND 30 # lifts  Has not been RTW by carrier  Has had TWO LESI by Pain Medicine  5/4 follow up last LESI was December 2016  Has been on HEP since last visit feels worse  Has pain with sneeze  6/8 fu after NS eval  Dr Eb Juarez advised no surgery, activity mods  , consider neuropathic pain meds  and repeat LESI   last one was Dec  2016  Zulma Lutz PCP Rx 'd gabapentin but has yet to start  Denies having a EDX study  8/17 fu with    did NOT have LESI due to scheduling problem   has appointment  Still working 40 hour / week  10/12 FU after right SIJ injection 10/5   lasted for two hours  Started Dr Doyle Jacobs been started on pregabalin by PM has yet to start pending insurance auth  Rare Sx in right foot  11/7 fu for work related pain   has been terminated from employer  Treated with chiropractor   on pregabalin from PM       Review of Systems    Neurological: right lateral leg , numbness and tingling  ROS reviewed  Active Problems    1  Abnormal weight gain (783 1) (R63 5)   2  Allergic rhinitis (477 9) (J30 9)   3  Anemia (285 9) (D64 9)   4  Chronic pain disorder (338 4) (G89 4)   5  Depression with anxiety (300 4) (F41 8)   6  Eczema (692 9) (L30 9)   7  Intervertebral disc disorders with radiculopathy, lumbosacral region (724 4) (M51 17)   8  Lumbar radiculopathy (724 4) (M54 16)   9   Morbid obesity due to excess calories (278 01) (E66 01)   10  Sacroiliitis (720 2) (M46 1)   11  Spinal stenosis of lumbar region (724 02) (M48 061)   12  Work related injury (959 9) (Y99 0)    Past Medical History    1  History of Cellulitis of hand (682 4) (L03 119)   2  Eczema (692 9) (L30 9)    The active problems and past medical history were reviewed and updated today  Surgical History    1  History of Back Surgery    The surgical history was reviewed and updated today  Family History  Mother    1  Family history of anemia (V18 2) (Z83 2)   2  Family history of hypertension (V17 49) (Z82 49)   3  Family history of migraine headaches (V17 2) (Z82 0)  Father    4  Family history of Coronary Artery Disease (V17 49)   5  Family history of    6  Family history of hypertension (V17 49) (Z82 49)  Sister    9  Family history of asthma (V17 5) (Z82 5)  Brother    8  Family history of asthma (V17 5) (Z82 5)  Uncle    9  Family history of bipolar disorder (V17 0) (Z81 8)  Family History    10  Family history of Asthma (V17 5)   11  Family history of bipolar disorder (V17 0) (Z81 8)   12  Denied: Family history of drug addiction    The family history was reviewed and updated today  Social History    · Being A Social Drinker   · Caffeine use (V49 89) (F15 90)   · Current Every Day Smoker (305 1)   · Does not exercise (V69 0) (Z72 3)   · Has 1 child   · High school or GED   · Lives with mother (single parent)   · Occupation   · Sexual abstinence  The social history was reviewed and is unchanged  Current Meds   1  Advil 200 MG Oral Capsule; TAKE 2 CAPSULE Daily PRN; Therapy: (Recorded:19Zeg5160) to Recorded   2  Benadryl 25 MG CAPS; TAKE 1 CAPSULE AT BEDTIME; Therapy: (Recorded:00Pxm9041) to Recorded   3  BusPIRone HCl - 10 MG Oral Tablet; TAKE 1 TABLET AT BEDTIME; Therapy: 70CSF6832 to (Evaluate:67Pcj8313)  Requested for: 95LXW2885; Last   Rx:2017 Ordered   4   Lyrica 75 MG Oral Capsule; Take 1 capsule twice daily; Therapy: 69GGP1429 to (Evaluate:19Nov2017); Last Rx:20Sep2017 Ordered   5  Vanicream External Cream; APPLY SPARINGLY TO AFFECTED AREA(S) TWICE DAILY; Therapy: 03AVT7094 to (Last Rx:06Jun2017)  Requested for: 06Jun2017; Status: ACTIVE   - Transmit to Pharmacy - Awaiting Verification Ordered   6  ZyrTEC Allergy 10 MG Oral Tablet; Take 1 tablet daily; Therapy: (Recorded:33Jwf1571) to Recorded    The medication list was reviewed and updated today  Allergies    1  Cipro SOLN    2  Peanuts   3  Seasonal    Vitals  Signs   Recorded: 67ZEL2736 11:25AM   Heart Rate: 84  Systolic: 532  Diastolic: 80  Height: 5 ft 4 in  Weight: 228 lb   BMI Calculated: 39 14  BSA Calculated: 2 07    Results/Data  I personally reviewed the films/images/results in the office today  My interpretation follows  MRI Review cannot view disc  Diagnostic Review acute Schmorl's node L-5 / S-1, with no change in disc  Her LAST injection was SIJ, LAST LESI was Dec, 2016  Provider Comments    Discussed with MS Al that as this is the same disc [facility] in [facility] I consider this a progression of that injury        Future Appointments    Date/Time Provider Specialty Site   12/26/2017 10:00 AM Gavin Laguna DO [address] [place] [address]   Electronically signed by : David Sheth DO; Nov 7 2017  1:54PM EST                       (Author)

## 2018-01-17 NOTE — MISCELLANEOUS
Message  Return to work or school:   Anamaria Abraham is under my professional care  She was seen in my office on aUG 17TH   She is able to return to work on  AS SCHEDULED      CONTINUE CURRENT RESTRICTIONS  30 # Adarsh Pitts / Jesus Conley / Vimal Jiménez / CARRY LIMIT        Signatures   Electronically signed by : Ben Sue DO; Aug 17 2017  8:26AM EST                       (Author)

## 2018-01-22 VITALS
SYSTOLIC BLOOD PRESSURE: 128 MMHG | HEIGHT: 64 IN | DIASTOLIC BLOOD PRESSURE: 80 MMHG | HEART RATE: 80 BPM | BODY MASS INDEX: 39.27 KG/M2 | WEIGHT: 230 LBS

## 2018-01-22 VITALS
HEART RATE: 80 BPM | WEIGHT: 230 LBS | BODY MASS INDEX: 39.27 KG/M2 | HEIGHT: 64 IN | SYSTOLIC BLOOD PRESSURE: 110 MMHG | DIASTOLIC BLOOD PRESSURE: 80 MMHG

## 2018-01-22 VITALS
BODY MASS INDEX: 39.44 KG/M2 | WEIGHT: 231 LBS | SYSTOLIC BLOOD PRESSURE: 130 MMHG | DIASTOLIC BLOOD PRESSURE: 82 MMHG | HEIGHT: 64 IN | HEART RATE: 80 BPM

## 2018-01-23 NOTE — PROGRESS NOTES
Assessment   1  Work related injury (959 9) (Y99 0)  2  Lumbar radiculopathy (724 4) (M54 16)  3  Intervertebral disc disorders with radiculopathy, lumbosacral region (724 4) (M51 17)    Plan  Intervertebral disc disorders with radiculopathy, lumbosacral region    · Follow-up visit in 6 months Evaluation and Treatment  Follow-up  Status: Hold For -  Scheduling  Requested for: 06BPK1101   · Pain Management Follow Up Evaluation and Treatment  SEE IF PT CAN BE SEEN  SOONER THAN 5 WEEKS    Status: Hold For - Scheduling  Requested for: 35Dvt2847    Discussion/Summary  Impression: disc herniation  Currently, the condition is unchanged  Treatment plan includes activity modification and pain medicine consultation  Patient discussion: discussed with the patient  Chief Complaint  Work related LBP   1/19 follow up   accompanied by   2/17 follow up accompanied by   3/16 f/u accompanied by CM   4/6 follow after PTx accompanied by CM  5/4 [facility]  6/8 WR f/u  8/17 seen two months ago for work injury  History of Present Illness  33 t yo female with discogenic LBP   has had two LESDesmond   has phone follow up pending next week   states no pain during daytime if active   notes pain with recumbant position at , local to , feels like it moves   no PT since October due to school schedule  Is on per belkis care at [facility]   has restrictions  Has had prior IDET 2012 (Naftulin) Taking no meds for LBP  Feels 70 % improvement with LESIs  Had been working with restriction untill September  Has DONTA with Ambrosio Sandoval in January 1/19 had DONTA and released to full duty   has yet to be sent official  to usual occupation  last worked September  Hilary Blend on per belkis now   reports URI since Decc seen at ED  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  Currently today some left lateral hip and buttock pain  Attends PT at [facility] last two weeks   Has no further caer planned thru PM    2/17 working with restrictions at usual employer   in PT   per PT note of 2/15 NOT at level of job capabiltiy but liely am push W/ C    3/16 working full time with restrictions   in PT unable to tolerate shelve level lift of 40 # due to "belly pain" per CM carrier is "waiting" for outcome of PTx  Lisa LONGO physician is waiting for records  Currently having right leg Sx    4[facility]   has had subjective pain with 40 AND 30 # lifts  Has not been RTW by carrier  Has had TWO LESI by Pain Medicine  5/4 follow up last LESI was December 2016  Has been on HEP since last visit feels worse  Has pain with sneeze  6/8 fu after NS eval  Dr Anne Marie Wiggins advised no surgery, activity mods  , consider neuropathic pain meds  and repeat LESI   last one was Dec  2016  Lisa Villasenor PCP Rx 'd gabapentin but has yet to start  Denies having a EDX study  8/17 fu with    did NOT have LESI due to scheduling problem   has appointment  Still working 40 hour / week  Review of Systems    Gastrointestinal: no complaints of abdominal pain, no constipation, no nausea or diarrhea, no vomiting, no bloody stools  Genitourinary: no complaints of dysuria, no incontinence  Musculoskeletal: right LE Sx , but as noted in HPI  Active Problems   1  Abnormal weight gain (783 1) (R63 5)  2  Allergic rhinitis (477 9) (J30 9)  3  Anemia (285 9) (D64 9)  4  Chronic pain disorder (338 4) (G89 4)  5  Depression with anxiety (300 4) (F41 8)  6  Eczema (692 9) (L30 9)  7  Intervertebral disc disorders with radiculopathy, lumbosacral region (724 4) (M51 17)  8  Lumbar radiculopathy (724 4) (M54 16)  9  Morbid obesity due to excess calories (278 01) (E66 01)  10  Spinal stenosis of lumbar region (724 02) (M48 06)  11  Work related injury (959 9) (Y99 0)    Past Medical History   1  History of Cellulitis of hand (682 4) (L03 119)  2  Eczema (692 9) (L30 9)    The active problems and past medical history were reviewed and updated today        Surgical History   1  History of Back Surgery    Family History  Mother   1  Family history of anemia (V18 2) (Z83 2)  2  Family history of hypertension (V17 49) (Z82 49)  3  Family history of migraine headaches (V17 2) (Z82 0)  Father   4  Family history of Coronary Artery Disease (V17 49)  5  Family history of   6  Family history of hypertension (V17 49) (Z82 49)  Sister   9  Family history of asthma (V17 5) (Z82 5)  Brother   8  Family history of asthma (V17 5) (Z82 5)  Uncle   9  Family history of bipolar disorder (V17 0) (Z81 8)  Family History   10  Family history of Asthma (V17 5)  11  Family history of bipolar disorder (V17 0) (Z81 8)  12  Denied: Family history of drug addiction    Social History    · Being A Social Drinker   · Caffeine use (V49 89) (F15 90)   · Current Every Day Smoker (305 1)   · Does not exercise (V69 0) (Z72 3)   · Has 1 child   · High school or GED   · Lives with mother (single parent)   · Occupation   · Sexual abstinence  The social history was reviewed and is unchanged  Current Meds  1  Advil 200 MG Oral Capsule; TAKE 2 CAPSULE Daily PRN; Therapy: (Recorded:00Ndk7656) to Recorded  2  Benadryl 25 MG CAPS; TAKE 1 CAPSULE AT BEDTIME; Therapy: (Recorded:91Qub6838) to Recorded  3  BusPIRone HCl - 10 MG Oral Tablet; TAKE 1 TABLET AT BEDTIME; Therapy: 61ERX0895 to (Evaluate:56Fpe6608)  Requested for: 08BTJ5710; Last   Rx:2017 Ordered  4  Gabapentin 100 MG Oral Capsule; Take 1 capsule twice daily; Therapy: 98QIQ3365 to (Evaluate:89Xdm1358)  Requested for: 32PTB2559; Last   Rx:2017; Status: ACTIVE - Transmit to Pharmacy - Awaiting Verification Ordered  5  Meloxicam 7 5 MG Oral Tablet; TAKE 1 TABLET TWICE DAILY WITH FOOD; Therapy: 68EDU1127 to (Evaluate:45Tvr0046)  Requested for: 65APY1085; Last   Rx:2017 Ordered  6  Vanicream External Cream; APPLY SPARINGLY TO AFFECTED AREA(S) TWICE DAILY;    Therapy: 42YSR1089 to (Last TN:48WXE8605)  Requested for: 00OCO8236; Status: ACTIVE   - Transmit to Pharmacy - Awaiting Verification Ordered  7  ZyrTEC Allergy 10 MG Oral Tablet; Take 1 tablet daily; Therapy: (Recorded:81Iga9015) to Recorded    The medication list was reviewed and updated today  Allergies   1  Cipro SOLN   2  Seasonal    Vitals  Signs   Recorded: 31Rho4660 08:09AM   Heart Rate: 80  Systolic: 279  Diastolic: 80  Height: 5 ft 4 in  Weight: 230 lb   BMI Calculated: 39 48  BSA Calculated: 2 08    Physical Exam    Constitutional   General appearance: Abnormal   overweight  Lumbar/Sacral Spine examination demonstrates Lumbosacral Spine: Independent in transfers and ambulation  Evaluation of Muscle Stretch Reflexes on the right side demonstrates 1/4 Ankle Jerk Reflex, but 2/4 Hamstring Reflex, 2/4 Knee Jerk Reflex and negative right ankle clonus  Evaluation of Muscle Stretch Reflexes on the left side demonstrates negative left ankle clonus, but 2/4 Hamstring Reflex, 2/4 Knee Jerk Reflex and 2/4 Ankle Jerk Reflex  Results/Data  I personally reviewed the films/images/results in the office today  My interpretation follows  MRI Review HIZ at L-5 / S-1 level with slightly smaller HNP  Provider Comments    wXTRA TIME SPENT DUE TO NURSE  PRESENT  1        1 Amended By: Anuja Garibay; Aug 17 2017 8:25 AM EST    Future Appointments    Date/Time Provider Specialty Site   09/20/2017 10:45 AM Clearwater SoundOut, MD Pain Management [place] [address]   Electronically signed by : Melita Clinton DO; Aug 17 2017  8:24AM EST                       (Author)    Electronically signed by : Melita Clinton DO; Aug 17 2017  8:25AM EST                       (Author)

## 2018-01-23 NOTE — RESULT NOTES
Message   Recorded as Task   Date: 12/12/2017 01:53 PM, Created By: Randall Kohler   Task Name: Follow Up   Assigned To: Jeannine Bolus procedure,Team   Regarding Patient: Ramez Bae, Status: Active   CommentMarlanvan Ku - 12 Dec 0621 1:96 PM     TASK CREATED  S/P LUZ ELENA ON 12/5/2017 W/ DR Karine Oh - NO F/U SCHEDULED   Radha Vera - 13 Dec 2017 11:12 AM     TASK EDITED  Patient states she got 0% relief after the injection pain level is a 7/10  No f/u scheduled   Rivera Crespo - 13 Dec 2017 11:46 AM     TASK REPLIED TO: Previously Assigned To Rivera Crespo  f/u with Dr Nuvia Srinivasan as scheduled   Yosvany Caban - 13 Dec 2017 11:50 AM     TASK EDITED  Patient is aware and is scheduled to see Dr Nuvia Srinivasan in 2wks     Rivera Crespo - 13 Dec 2017 11:52 AM     TASK REPLIED TO: Previously Assigned To Rivera Crespo md aware

## 2018-01-23 NOTE — PROGRESS NOTES
Assessment    1  Intervertebral disc disorders with radiculopathy, lumbosacral region (724 4) (M51 17)   2  Lumbar radiculopathy (724 4) (M54 16)    Plan  Lumbar radiculopathy    · 3 - Marsha LAUREN, Arnaldo Geronimo  (Neurosurgery) Co-Management  L-5/S-1 HNP with features of ruight  bety S-1 involvement   no change with spinal interventions  Status: Hold For -  Scheduling  Requested for: 38NMV0895  are Referring to a non- Preferred Provider : 2nd/3rd Opinion  Care Summary provided  : Yes   · Follow-up visit in 6 weeks Evaluation and Treatment  Follow-up  Status: Hold For -  Scheduling  Requested for: 25Vqx0988    Second opinion  Discussion/Summary  Impression: low back pain and disc herniation  Currently, the condition is unchanged  There are no changes in medication management  Treatment plan includes Spine Surgery consultation  Patient discussion: discussed with the patient, also   Chief Complaint  Work related LBP   1/19 follow up   accompanied by   2/17 follow up accompanied by   3/16 f/u accompanied by CM   4/6 follow after PTx accompanied by CM  [facility]  6/8 WRI f/u  8/17 seen two months ago for work injury  10/12 fu for work injury  11/7 fu for work injury  accompanied by Joshua Bartlett from [facility]  12/29 fu for work related injury  History of Present Illness  33 t yo female with discogenic LBP   has had two LESIs   has phone follow up pending next week   states no pain during daytime if active   notes pain with recumbant position at HS, local to , feels like it moves   no PT since October due to school schedule  Is on per belkis care at [facility]   has restrictions  Has had prior IDET 2012 (Naftulin) Taking no meds for LBP  Feels 70 % improvement with LESIs  Had been working with restriction untill September  Has DONTA with Annette Augustine in January 1/19 had DONTA and released to full duty   has yet to be sent official  to usual occupation   last worked September  Cate Moreau on per belkis now   reports URI since Decc seen at ED  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  Currently today some left lateral hip and buttock pain  Attends PT at [facility] last two weeks  Has no further caer planned thru PM    2/17 working with restrictions at usual employer   in PT   per PT note of 2/15 NOT at level of job capabiltiy but liely am push W/ C    3/16 working full time with restrictions   in PT unable to tolerate shelve level lift of 40 # due to "belly pain" per CM carrier is "waiting" for outcome of PTx  Cate Moreau DONTA physician is waiting for records  Currently having right leg Sx    4/6 follow up for [facility]   has had subjective pain with 40 AND 30 # lifts  Has not been RTW by carrier  Has had TWO LESI by Pain Medicine  5/4 follow up last LESI was December 2016  Has been on HEP since last visit feels worse  Has pain with sneeze  6/8 fu after NS eval  Dr London Can advised no surgery, activity mods  , consider neuropathic pain meds  and repeat LESI   last one was Dec  2016  Cate Moreau PCP Rx 'd gabapentin but has yet to start  Denies having a EDX study  8/17 fu with    did NOT have LESI due to scheduling problem   has appointment  Still working 40 hour / week  10/12 FU after right SIJ injection 10/5   lasted for two hours  Started Dr Mccall Medicine been started on pregabalin by PM has yet to start pending insurance auth  Rare Sx in right foot  11/7 fu for work related pain   has been terminated from employer  Treated with chiropractor   on pregabalin from PM    12/29 fu after sent to PM for LESI for Schmorl's node   trial of nabumetone   had LESI at :L-5/ S-1 12/5  She reports NO benefit from LESI at all  She continues to have LBP with radiation down to plantar surface  Unknown to me was she had a follow up DONTA with Dr Tony Sessions and no surgery recommended  Review of Systems    Musculoskeletal: as noted in HPI  Neurological: numbness and tingling  Psychiatric: anxiety  Active Problems    1  Abnormal weight gain (783 1) (R63 5)   2  Allergic rhinitis (477 9) (J30 9)   3  Anemia (285 9) (D64 9)   4  Chronic pain disorder (338 4) (G89 4)   5  Depression with anxiety (300 4) (F41 8)   6  Eczema (692 9) (L30 9)   7  Intervertebral disc disorders with radiculopathy, lumbosacral region (724 4) (M51 17)   8  Lumbar radiculopathy (724 4) (M54 16)   9  Morbid obesity due to excess calories (278 01) (E66 01)   10  Sacroiliitis (720 2) (M46 1)   11  Schmorl's node (722 30) (M51 9)   12  Spinal stenosis of lumbar region (724 02) (M48 061)   13  Work related injury (959 9) (Y99 0)    Past Medical History    1  History of Cellulitis of hand (682 4) (L03 119)   2  Eczema (692 9) (L30 9)    The active problems and past medical history were reviewed and updated today  Surgical History    1  History of Back Surgery    The surgical history was reviewed and updated today  Family History  Mother    1  Family history of anemia (V18 2) (Z83 2)   2  Family history of hypertension (V17 49) (Z82 49)   3  Family history of migraine headaches (V17 2) (Z82 0)  Father    4  Family history of Coronary Artery Disease (V17 49)   5  Family history of    6  Family history of hypertension (V17 49) (Z82 49)  Sister    9  Family history of asthma (V17 5) (Z82 5)  Brother    8  Family history of asthma (V17 5) (Z82 5)  Uncle    9  Family history of bipolar disorder (V17 0) (Z81 8)  Family History    10  Family history of Asthma (V17 5)   11  Family history of bipolar disorder (V17 0) (Z81 8)   12  Denied: Family history of drug addiction    Social History    · Being A Social Drinker   · Caffeine use (V49 89) (F15 90)   · Current Every Day Smoker (305 1)   · Does not exercise (V69 0) (Z72 3)   · Has 1 child   · High school or GED   · Lives with mother (single parent)   · Occupation   · Sexual abstinence    Current Meds   1  Advil 200 MG Oral Capsule; TAKE 2 CAPSULE Daily PRN;    Therapy: (Recorded:77Aml4860) to Recorded   2  Benadryl 25 MG CAPS; TAKE 1 CAPSULE AT BEDTIME; Therapy: (Recorded:16May2017) to Recorded   3  BusPIRone HCl - 10 MG Oral Tablet; TAKE 1 TABLET AT BEDTIME; Therapy: 19PME3266 to (Evaluate:11Sep2017)  Requested for: 37EFR1915; Last   Rx:13Jun2017 Ordered   4  Lyrica 75 MG Oral Capsule; Take 1 capsule twice daily; Therapy: 91YUE0906 to (Evaluate:19Nov2017); Last Rx:20Sep2017 Ordered   5  Nabumetone 500 MG Oral Tablet; TAKE 1 TABLET EVERY 12 HOURS AS NEEDED; Therapy: 58SAO2466 to (Evaluate:07Dec2017)  Requested for: 70TTQ8585; Last   Rx:07Nov2017 Ordered   6  Vanicream External Cream; APPLY SPARINGLY TO AFFECTED AREA(S) TWICE DAILY; Therapy: 19JER0373 to (Last Rx:06Jun2017)  Requested for: 06Jun2017; Status: ACTIVE   - Transmit to Pharmacy - Awaiting Verification Ordered   7  ZyrTEC Allergy 10 MG Oral Tablet; Take 1 tablet daily; Therapy: (Recorded:79Sse9479) to Recorded    The medication list was reviewed and updated today  Allergies    1  Cipro SOLN    2  Peanuts   3  Seasonal    Vitals  Signs   Recorded: 23RQV7209 11:10AM   Heart Rate: 80  Systolic: 459  Diastolic: 80  Height: 5 ft 4 in  Weight: 230 lb   BMI Calculated: 39 48  BSA Calculated: 2 08    Physical Exam        Lumbar/Sacral Spine examination demonstrates  right ankle jerj is diminished, no motor weakness  Results/Data    MRI Review Unable to view disc in office  Karrie Nissen Karrie Nissen Radiology reporting unchanged HNP contiguous with right S-1        Signatures   Electronically signed by : Ben Sue DO; Dec 29 2017 11:34AM EST                       (Author)

## 2018-01-23 NOTE — CONSULTS
Assessment   1  Herniated nucleus pulposus, L5-S1, right (722 10) (M51 27)  2  Lumbar radiculopathy (724 4) (M54 16)    Plan  Herniated nucleus pulposus, L5-S1, right, Lumbar radiculopathy    · Start: Meloxicam 7 5 MG Oral Tablet; TAKE 1 TABLET TWICE DAILY WITH FOOD   · 1 - Malena Virgen MD, Gladystine Retort A (Pain Management) Physician Referral  Consult consider LESI  Status: Active  Requested for: [id number]  () Care Summary provided  : Yes   · Physical Therapy Referral Other Physician Referral  Consult Continue PT same goals,  BIW, focus on passive extension program as florencio, trial of lumbar traction   home unit if  helpful  Status: Hold For - Scheduling  Requested for: [id number]  YOU are Referring to a non- Preferred Provider : Established Patient  () Care Summary provided  : Yes   · Follow-up visit in 3 weeks Evaluation and Treatment  Follow-up  Status: Hold For -  Scheduling  Requested for: [id number]    Discussion/Summary  Impression: L-5 / S-1 disc herniation and right S-1 radiculopathy  Medication changes are as documented in orders  Treatment plan includes physical therapy and pain medicine consultation  Patient discussion: discussed with the patient  Chief Complaint  Consult from Occupational medicine, Luana Mercer, Rita Gore  History of Present Illness  34 yo female with work related injury July 26th, 2016   was transferring obese pt to bed, stooped lifting leg  Seen at [facility][1], treated with Rx ibuprofen, and CBP  In PT at [place], working light duty at [employer] and [employer]  On 20 # restrictions among others as of 9/15/2016  Therapy relieves some "tension", heat and ice and stim helpful  Had prior disc problem, treated successfully with IDET by Dr Ziggy Gramajo  Pattern is axial LBP with radiation to arch of right foot, recently some LLE Sx  No bowel or bladder incontinence  No interventions  Review of Systems    Musculoskeletal: as noted in HPI  Neurological: as noted in HPI  Active Problems   1  Allergic rhinitis (477 9) (J30 9)  2  Cellulitis of hand (682 4) (L03 119)  3  Eczema (692 9) (L30 9)    Past Medical History   1  Eczema (692 9) (L30 9)    The active problems and past medical history were reviewed and updated today  Surgical History   1  History of Back Surgery    The surgical history was reviewed and updated today  Family History  Father   1  Family history of Coronary Artery Disease (V17 49)  Family History   2  Family history of Asthma (V17 5)    The family history was reviewed and updated today  Social History    · Being A Social Drinker   · Current Every Day Smoker (305 1)  The social history was reviewed and is unchanged  Current Meds  1  Benadryl Allergy 25 MG Oral Tablet; Therapy: (Recorded:92Kre0421) to Recorded  2  Ibuprofen 800 MG Oral Tablet; Therapy: (94 31 11) to Recorded  3  ZyrTEC Allergy 10 MG Oral Capsule; Therapy: (Recorded:27Mox9669) to Recorded    Allergies   1  Cipro SOLN    Vitals  Signs   Recorded: 20XVI9456 55:30PA   Systolic: 259, LUE, Sitting  Diastolic: 80, LUE, Sitting  Heart Rate: 90, L Radial  Pulse Quality: Regular, L Radial  Respiration Quality: Normal  Respiration: 16  Height: 5 ft 4 in  Weight: 220 lb 4 oz  BMI Calculated: 37 81  BSA Calculated: 2 04  BP Cuff Size: Large    Physical Exam    Lumbosacral Spine: Special Tests: some tenderness over SI joints  can heel and toe walk, but negative Straight Leg Raise  Constitutional - General appearance: Abnormal  obese  Musculoskeletal - Gait and station: Normal    Neurologic - Cranial nerves: Normal  Reflexes: Abnormal  Deep tendon reflexes: 1+ right ankle jerk2+ right biceps, 2+ left biceps, 2+ right triceps, 2+ left triceps, 2+ right brachioradialis, 2+ left brachioradialis, 2+ right patella, 2+ left patella, no ankle clonus on the right and no ankle clonus on the left  Results/Data  I personally reviewed the films/images/results in the office today   My interpretation follows  1    MRI Review Focal HNP at L-5 / S-1 with involvement of right S-1 root in face on congenital stenosis  (correlates with exam   )  to my eye clearly is central to right L-5 /S-1 protrusion that abuts nerve root  1         1 Amended By: Gayatri Rivera; Sep 29 2016 9:25 AM EST    Signatures   Electronically signed by : Vicki Garíca DO; Sep 29 2016  9:15AM EST                       (Author)    Electronically signed by : Vicki García DO; Sep 29 2016  9:26AM EST                       (Author)

## 2018-01-23 NOTE — MISCELLANEOUS
Message  Return to work or school:   Lima Gonzalez is under my professional care  She was seen in my office on Dec 29th, 2017  She is not able to participate in sports or gym class  Weight Bearing Status: Full Weight-Bearing  No lift / push / pull/ carry > 25 #          Signatures   Electronically signed by : Jose Espinoza DO; Dec 29 2017 11:35AM EST                       (Author)

## 2018-02-09 ENCOUNTER — OFFICE VISIT (OUTPATIENT)
Dept: PAIN MEDICINE | Facility: CLINIC | Age: 33
End: 2018-02-09
Payer: OTHER MISCELLANEOUS

## 2018-02-09 VITALS
DIASTOLIC BLOOD PRESSURE: 70 MMHG | SYSTOLIC BLOOD PRESSURE: 118 MMHG | BODY MASS INDEX: 39.61 KG/M2 | HEIGHT: 64 IN | WEIGHT: 232 LBS | HEART RATE: 76 BPM
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
## 2018-02-09 DIAGNOSIS — M51.9 SCHMORL'S NODE: ICD-10-CM

## 2018-02-09 DIAGNOSIS — M51.17 INTERVERTEBRAL DISC DISORDERS WITH RADICULOPATHY, LUMBOSACRAL REGION: ICD-10-CM

## 2018-02-09 PROBLEM — M48.061 SPINAL STENOSIS OF LUMBAR REGION: Status: ACTIVE | Noted: 2017-05-04

## 2018-02-09 PROCEDURE — 99213 OFFICE O/P EST LOW 20 MIN: CPT | Performed by: PHYSICAL MEDICINE & REHABILITATION

## 2018-02-09 RX ORDER — PREGABALIN 75 MG/1
1 CAPSULE ORAL 2 TIMES DAILY
COMMUNITY
Start: 2017-09-20 | End: 2018-08-09 | Stop reason: ALTCHOICE

## 2018-02-09 RX ORDER — DIPHENHYDRAMINE HCL 25 MG
1 CAPSULE ORAL
COMMUNITY

## 2018-02-09 RX ORDER — IBUPROFEN 600 MG/1
TABLET ORAL
COMMUNITY
Start: 2016-07-26 | End: 2018-08-17

## 2018-02-09 RX ORDER — EMOLLIENT BASE
CREAM (GRAM) TOPICAL 2 TIMES DAILY
COMMUNITY
Start: 2017-06-06 | End: 2018-08-09 | Stop reason: ALTCHOICE

## 2018-02-09 NOTE — PATIENT INSTRUCTIONS
1  Continue current medications  2  Follow up   will return disc at next visit or mail to you  3  Weight loss would be beneficial for your back  We can make medical weight loss referral thru [facility] if you secure insurance

## 2018-02-09 NOTE — LETTER
February 9, 2018     Patient: Haley Sherman   YOB: 1985   Date of Visit: 2/9/2018       To Whom it May Concern:    Haley Sherman is under my professional care  She was seen in my office on 2/9/2018  She may return to work with limitations 30 # lifting restriction       If you have any questions or concerns, please don't hesitate to call           Sincerely,          Alec Mays, DO        CC: Haley Sherman

## 2018-02-09 NOTE — PROGRESS NOTES
Assessment/Plan:      Diagnoses and all orders for this visit:    Work related injury    Schmorl's node    Intervertebral disc disorders with radiculopathy, lumbosacral region          Subjective:     Patient ID: Haley Sherman is a 27 y o  female  HPI  33 t yo female with discogenic LBP   has had two LESIs   has phone follow up pending next week   states no pain during daytime if active   notes pain with recumbant position at HS, local to , feels like it moves   no PT since October due to school schedule  Is on per belkis care at [facility]   has restrictions  Has had prior IDET 2012 (Naftulin) Taking no meds for LBP  Feels 70 % improvement with LESIs  Had been working with restriction untill September  Has DONTA with Annette Augustine in January 1/19 had DONTA and released to full duty   has yet to be sent official  to usual occupation  last worked September  [facility] on per belkis now   reports URI since [facility] seen at ED  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[facility]  has been called once but was ill  Currently today some left lateral hip and buttock pain  Attends PT at [facility] [place] last two weeks  Has no further caer planned thru PM    2/17 working with restrictions at usual employer   in PT   per PT note of 2/15 NOT at level of job capabiltiy but liely am push W/ C    3/16 working full time with restrictions   in PT unable to tolerate shelve level lift of 40 # due to "belly pain" per CM carrier is "waiting" for outcome of PTx  [facility] DONTA physician is waiting for records  Currently having right leg Sx    4/6 follow up for [facility]   has had subjective pain with 40 AND 30 # lifts  Has not been RTW by carrier  Has had TWO LESI by Pain Medicine  5/4 follow up last LESI was December 2016  Has been on HEP since last visit feels worse  Has pain with sneeze  6/8 fu after NS eval  Dr Fer Gann advised no surgery, activity mods  , consider neuropathic pain meds  and repeat LESI   last one was Dec  2016  [facility] PCP Rx 'd gabapentin but has yet to start  Denies having a EDX study  8/17 fu with    did NOT have LESI due to scheduling problem   has appointment  Still working 40 hour / week  10/12 FU after right SIJ injection 10/5   lasted for two hours  Started Dr Alessandro Shearer been started on pregabalin by PM has yet to start pending insurance auth  Rare Sx in right foot  11/7 fu for work related pain   has been terminated from employer  Treated with chiropractor   on pregabalin from PM    12/29 fu after sent to PM for LESI for Schmorl's node   trial of nabumetone   had LESI at :L-5/ S-1 12/5  She reports NO benefit from LESI at all  She continues to have LBP with radiation down to plantar surface  Unknown to me was she had a follow up DONTA with Dr Rhys Pastor and no surgery recommended  2/9/18 fu after second opinion with [facility], just seen yesterday (no report), not working, no longer gas , not working and possible termination of medical benefits  Continues of pattern of right-sided sciatic symptoms radiating down into the lateral toes with pain and numbness  Review of Systems   Gastrointestinal: Negative  Genitourinary: Negative  Musculoskeletal: Positive for back pain  Skin: Positive for rash  Neurological: Positive for numbness  Objective:  Dr Pj Florezort note: Had extensive conversation with patient  MRI LSS 10/2017 personally reviewed  L5-S1 DDD, spondylosis, disc  Have discussed risks, benefits of continued conservative measures vs surgery  Have explained rationale of posterior decompression vs posterior decompression with fusion  Rationale of smoking cessation also discussed  She expressed full understanding  She will call us if she wants to pursue surgery  All questions answered  MRI disc cannot be viewed and not found in PACS  Physical Exam   Musculoskeletal:   Positive straight leg raising in the seated position on the right side  Neurological: She has normal reflexes

## 2018-03-07 NOTE — PROCEDURES
Procedure      Pre-procedure Diagnosis: Lumbosacral disc disorder with radiculopathy  Post-procedure Diagnosis: Lumbosacral disc disorder with radiculopathy  Procedure Title(s):  1  L5 interlaminar epidural steroid injection      2  Intraoperative fluoroscopy  Attending Surgeon:   Rand Velez MD  Anesthesia:   Local     Indications: The patient is a 27year-old female with a diagnosis of lumbosacral disc disorder with radiculopathy  The patient's history and physical exam were reviewed  The risks, benefits and alternatives to the procedure were discussed, and all questions were answered to the patient's satisfaction  The patient agreed to proceed, and written informed consent was obtained  Procedure in Detail: The patient was brought into the procedure room and placed in the prone position on the fluoroscopy table  The area of the lumbar spine was prepped with chlorhexidine gluconate solution times one and draped in a sterile manner  The L5-S1 interspace was identified and marked under AP fluoroscopy  The skin and subcutaneous tissues in the area were anesthetized with 1% lidocaine  A 20-gauge Tuohy epidural needle was directed toward the interspace under fluoroscopic guidance until the ligamentum flavum was engaged  From this point, a loss of resistance technique with air was used to identify entrance of the needle into the epidural space  Once an appropriate loss was obtained, negative aspiration was confirmed, and 1 ml Omnipaque 300 contrast solution was injected  An appropriate epidurogram was noted  Then, after negative aspiration, a solution consisting of 1-mL depo-medrol (80mg/mL) and 2-mL bupivacaine 0 25% and 3-mL preservative-free saline was easily injected  The needle was removed with a 1% lidocaine flush  The patient's back was cleaned and a bandage was placed over the site of needle insertion      Disposition: The patient tolerated the procedure well, and there were no apparent complications  The patient was taken to the recovery area where written discharge instructions for the procedure were given             Signatures   Electronically signed by : Laure Henderson MD; Dec  5 2017  8:55AM EST                       (Author)

## 2018-03-07 NOTE — PROCEDURES
Procedure    Pre-procedure Diagnosis: Sacroiliitis  Post-procedure Diagnosis: Sacroiliitis  Operation Title(s):  1  Right Sacroiliac joint injection      2  Intraoperative fluoroscopy  Attending Surgeon:   Leatha Garcia MD  Anesthesia:   Local    Indications: The patient is a 27year-old female with a diagnosis of sacroiliitis  The patient's history and physical exam were reviewed  The risks, benefits and alternatives to the procedure were discussed, and all questions were answered to the patient's satisfaction  The patient agreed to proceed, and written informed consent was obtained  Procedure in Detail: The patient was brought into the procedure room and placed in the prone position on the fluoroscopy table  The low back and upper buttock were prepped with chloraprep and draped in a sterile manner  AP fluoroscopy was used to visualize the right sacroiliac joint  The fluoroscopic beam was then obliqued until the anterior and posterior margins of the joint were aligned  The inferior margin of the joint was identified and marked  The skin and subcutaneous tissues in the area were anesthetized with 1% lidocaine  A 22-gauge, 3Â½-inch spinal needle was advanced toward the identified point under fluoroscopic guidance  Once the targeted point was reached and the joint space was entered, negative aspiration was confirmed, and 1ml of contrast was injected  The joint space was appropriately outlined  Then, after negative aspiration, a solution consisting of 3 mL 0 25% bupivacaine and 1 mL Depo-Medrol (80mg/ml) were easily injected  The needle was removed with a 1% lidocaine flush  The patient's back was cleaned and a bandage was placed over the sites of needle insertion  Disposition: The patient tolerated the procedure well and there were no apparent complications  Vital signs remained stable throughout the procedure   The patient was taken to the recovery area where written discharge instructions for the procedure were given          Signatures   Electronically signed by : Jaylen Khan MD; Oct  5 2017  9:19AM EST                       (Author)

## 2018-04-05 NOTE — PROGRESS NOTES
Assessment/Plan:      Diagnoses and all orders for this visit:    Schmorl's node    Intervertebral disc disorders with radiculopathy, lumbosacral region    Work related injury    Spinal stenosis of lumbar region with neurogenic claudication      Short course of physical therapy to review her home exercise program with attention to hip flexors hip girdle gluteals an ITB which she could not demonstrated program for today  Trial of tricyclic medication for pain and sleep  Her CD was returned to her today  Subjective:     Patient ID: Bharat Patricio is a 27 y o  female  HPI 4/6/18 fu after NS at [facility] surgery discussed , taking no analgesics, using tens  Last LESI Continues to have LBP and RLE Sx into arch of foot  Sibling notes she "drags foot"    Review of Systems   Gastrointestinal: Negative  Genitourinary: Negative  Neurological: Positive for numbness  Psychiatric/Behavioral: The patient is nervous/anxious  Objective:     Physical Exam   Neurological:   Reflex Scores:       Tricep reflexes are 2+ on the right side and 2+ on the left side  Bicep reflexes are 2+ on the right side and 2+ on the left side  Brachioradialis reflexes are 2+ on the right side and 2+ on the left side  Patellar reflexes are 2+ on the right side and 2+ on the left side  Achilles reflexes are 2+ on the left side  Able to stand on toes and heel, some difficulty with dorsiflexion on the right, + TTP Right ITB  She described "yoga positions" and TB exercises from Ptx, she could NOT describe any ITB stretches

## 2018-04-06 ENCOUNTER — OFFICE VISIT (OUTPATIENT)
Dept: PAIN MEDICINE | Facility: CLINIC | Age: 33
End: 2018-04-06
Payer: OTHER MISCELLANEOUS

## 2018-04-06 VITALS
WEIGHT: 235 LBS | HEART RATE: 80 BPM | SYSTOLIC BLOOD PRESSURE: 102 MMHG | HEIGHT: 64 IN | DIASTOLIC BLOOD PRESSURE: 78 MMHG | BODY MASS INDEX: 40.12 KG/M2

## 2018-04-06 DIAGNOSIS — M76.31 ILIOTIBIAL BAND SYNDROME OF RIGHT SIDE: ICD-10-CM

## 2018-04-06 DIAGNOSIS — M51.17 INTERVERTEBRAL DISC DISORDERS WITH RADICULOPATHY, LUMBOSACRAL REGION: ICD-10-CM
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
## 2018-04-06 DIAGNOSIS — M48.062 SPINAL STENOSIS OF LUMBAR REGION WITH NEUROGENIC CLAUDICATION: ICD-10-CM

## 2018-04-06 DIAGNOSIS — M51.9 SCHMORL'S NODE: Primary | ICD-10-CM

## 2018-04-06 PROCEDURE — 99213 OFFICE O/P EST LOW 20 MIN: CPT | Performed by: PHYSICAL MEDICINE & REHABILITATION

## 2018-04-06 RX ORDER — CYCLOBENZAPRINE HCL 5 MG
TABLET ORAL
Qty: 30 TABLET | Refills: 0 | Status: SHIPPED | OUTPATIENT
Start: 2018-04-06

## 2018-04-06 NOTE — LETTER
April 6, 2018     [address] [place] [address]    Patient: Rashad Park   YOB: 1985   Date of Visit: 4/6/2018       Dear Dr Sylvain Harding:    Thank you for referring Rashad Park to me for evaluation  Below are the relevant portions of my assessment and plan of care  Diagnoses and all orders for this visit:    Schmorl's node    Intervertebral disc disorders with radiculopathy, lumbosacral region    Work related injury    Spinal stenosis of lumbar region with neurogenic claudication        If you have questions, please do not hesitate to call me  I look forward to following Francisco Javier Schwab along with you           Sincerely,        Renny Cameron DO        CC: No Recipients

## 2018-04-13 ENCOUNTER — EVALUATION (OUTPATIENT)
Dept: PHYSICAL THERAPY | Facility: REHABILITATION | Age: 33
End: 2018-04-13
Payer: OTHER MISCELLANEOUS

## 2018-04-13 DIAGNOSIS — M76.31 ILIOTIBIAL BAND SYNDROME OF RIGHT SIDE: ICD-10-CM
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
## 2018-04-13 DIAGNOSIS — M51.17 INTERVERTEBRAL DISC DISORDERS WITH RADICULOPATHY, LUMBOSACRAL REGION: Primary | ICD-10-CM

## 2018-04-13 DIAGNOSIS — M51.9 SCHMORL'S NODE: ICD-10-CM

## 2018-04-13 PROCEDURE — 97162 PT EVAL MOD COMPLEX 30 MIN: CPT

## 2018-04-13 PROCEDURE — G8990 OTHER PT/OT CURRENT STATUS: HCPCS

## 2018-04-13 PROCEDURE — 97110 THERAPEUTIC EXERCISES: CPT

## 2018-04-13 PROCEDURE — G8991 OTHER PT/OT GOAL STATUS: HCPCS

## 2018-04-13 NOTE — PROGRESS NOTES
PT Evaluation     Today's date: 2018  Patient name: Anthony Hilario  : 1985  MRN: [id number]  Referring provider: Chapincito Clarke DO  Dx:   Encounter Diagnosis     ICD-10-CM    1  Intervertebral disc disorders with radiculopathy, lumbosacral region M51 17 Ambulatory referral to Physical Therapy   2  Schmorl's node M51 9 Ambulatory referral to Physical Therapy   3  Work related injury Y99 0 Ambulatory referral to Physical Therapy   4  Iliotibial band syndrome of right side M76 31 Ambulatory referral to Physical Therapy                  Assessment  Impairments: abnormal gait, abnormal muscle firing, activity intolerance, lacks appropriate home exercise program and pain with function    Assessment details: Client is a 27year old female who sustained an injury at work in 2016 and has was on light duty until 2017  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  Client has been restricted to 35 lbs capacity lifting at this time  She presents with pain in lower back with radiation to the Right leg, muscles spasms in lumbar paraspinals, weak transverse abdominus, limited ROM and muscle strenght in lower back and abdominal musculature  Patient  will benefit from skilled PT intervention to maximze functional status, decrease pain, increase tolerance to activity, muscle streght and develop a HEP  Understanding of Dx/Px/POC: good   Prognosis: good    Goals  STG;  Improve muscle strength 4/5   Improve AROM lumbar spine and hip by 20%  Decrease pain 5/10 with activity  Ambulate without pain for 10 minutes  LTG  Improve MS to 5/5 L/S and Right LE  Increase AROM to [facility]  Decrease pain with activity 0-1/10  Patient will be able to ambulate 20 minutes without increase in pain      Plan  Planned modality interventions: high voltage pulsed current: pain management, hydrotherapy and unattended electrical stimulation  Planned therapy interventions: abdominal trunk stabilization, joint mobilization, manual therapy, massage, neuromuscular re-education, patient education, postural training, functional ROM exercises, gait training, therapeutic exercise, home exercise program and therapeutic activities  Frequency: 3x week  Duration in weeks: 8  Treatment plan discussed with: patient        Subjective Evaluation    History of Present Illness  Date of onset: 2016  Mechanism of injury: trauma  Mechanism of injury: 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  Later on she started feeling more and more lower back pain 2016  She is being followed by pain specialist and she reports that her symptoms are worsening  Patient reports she has gain 31 lbs since incident  Recurrent probem    Quality of life: good    Pain  Current pain ratin  At best pain ratin  At worst pain rating: 10  Location: Right lower back radiating towards foot  Quality: throbbing and discomfort  Relieving factors: heat, medications and change in position  Aggravating factors: walking and lifting  Progression: worsening    Social Support  Steps to enter house: yes  Stairs in house: yes   Lives in: multiple-level home  Lives with: young children    Employment status: not working  Hand dominance: right      Diagnostic Tests  MRI studies: abnormal (disorder)  Treatments  Previous treatment: physical therapy and injection treatment  Patient Goals  Patient goals for therapy: return to work, decreased pain, increased strength and independence with ADLs/IADLs  Patient goal: I want to be able to walk         Objective     Palpation   Left   Hypotonic in the external abdominal oblique, rectus abdominus and transverse abdominus  Muscle spasm in the lumbar paraspinals  Tenderness of the lumbar paraspinals  Right   Hypotonic in the external abdominal oblique and rectus abdominus  Muscle spasm in the gluteus tiffany and lumbar paraspinals  Tenderness of the gluteus tiffayn and lumbar paraspinals       Active Range of Motion Lumbar   Flexion: 55 (Right hip pain) degrees with pain  Extension: 15 degrees   Left lateral flexion: 25 degrees   Right lateral flexion: 20 (Righ hip pain) degrees with pain  Left rotation: 40 degrees   Right rotation: 30 degrees   Left Hip   Flexion: 95 degrees   Extension: 15 degrees   Abduction: WFL  External rotation (90/90): 30 degrees   Internal rotation (90/90): 35 degrees     Right Hip   Flexion: 65 degrees   Extension: 10 degrees   Abduction: 30 degrees   External rotation (90/90): 30 degrees with pain  Internal rotation (90/90): 20 degrees with pain  Left Knee   Flexion: 120 degrees   Extension: 0 degrees     Right Knee   Flexion: 120 degrees   Extension: 0 degrees     Additional Active Range of Motion Details  Right hip discomfort 3-4/10    Strength/Myotome Testing     Left Hip   Planes of Motion   Flexion: 4+  Extension: 4+  Abduction: 4+  Adduction: 4+  External rotation: 5  Internal rotation: 5    Isolated Muscles   Gluteus tiffany: 4  Iliopsoas: 5    Right Hip   Planes of Motion   Flexion: 3+  Extension: 3-  Abduction: 4-  Adduction: 3+  External rotation: 3+  Internal rotation: 3+    Isolated Muscles   Gluteus maximums: 3+  Iliopsoas: 3+    Additional Strength Details  Patient with increased pain/ discomfort in her lower back and Right hip with test     Ambulation     Ambulation: Level Surfaces   Ambulation with assistive device: independent    Additional Level Surfaces Ambulation Details  Patient presents with decreased stance phase on Right, increased lateral displacement towards left and decreased step length  Quality of Movement During Gait     Pelvis  Anterior pelvic tilt             Precautions: none    Daily Treatment Diary     Manual                                                                                   Exercise Diary  4/13            PPT 10 x ea            Bridging 10 x eac            SKC 10 x eac            Piriformis stretch 10x eac            Supine abd stabilization 10x ea            SLR 10x each            Hip abd 10x eac                                                                                                                                                                                         Modalities

## 2018-04-16 ENCOUNTER — OFFICE VISIT (OUTPATIENT)
Dept: PHYSICAL THERAPY | Facility: REHABILITATION | Age: 33
End: 2018-04-16
Payer: OTHER MISCELLANEOUS

## 2018-04-16 DIAGNOSIS — M51.9 SCHMORL'S NODE: Primary | ICD-10-CM

## 2018-04-16 DIAGNOSIS — M51.17 INTERVERTEBRAL DISC DISORDERS WITH RADICULOPATHY, LUMBOSACRAL REGION: ICD-10-CM
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
## 2018-04-16 DIAGNOSIS — M76.31 ILIOTIBIAL BAND SYNDROME OF RIGHT SIDE: ICD-10-CM

## 2018-04-16 PROCEDURE — 97140 MANUAL THERAPY 1/> REGIONS: CPT | Performed by: PHYSICAL THERAPIST

## 2018-04-16 PROCEDURE — 97110 THERAPEUTIC EXERCISES: CPT | Performed by: PHYSICAL THERAPIST

## 2018-04-16 PROCEDURE — 97112 NEUROMUSCULAR REEDUCATION: CPT | Performed by: PHYSICAL THERAPIST

## 2018-04-16 NOTE — PROGRESS NOTES
Daily Note     Today's date: 2018  Patient name: Cyn Whitley  : 1985  MRN: [id number]  Referring provider: Umesh Dowell DO  Dx:   Encounter Diagnosis     ICD-10-CM    1  Schmorl's node M51 9    2  Intervertebral disc disorders with radiculopathy, lumbosacral region M51 17                   Subjective: Patient reports having persistent lumbar pain with symptoms into left distal extremity (ankle)  Objective: See treatment diary below      Assessment: Postural assessment reveals significant lumbar lordosis requiring frequent manual and verbal cues to correct posture with core stabilization Te  Patient performed all TE in pain free ranges with constant cueing  Tolerated treatment well  Patient demonstrated fatigue post treatment and exhibited good technique with therapeutic exercises      Plan: Continue per plan of care       Precautions: none    Daily Treatment Diary     Manual              S/L lumbar gapping (l5/S1) 5'            Paraspinal STM in S/L 5'            Sciatic nerve glides 8x38f67"            Piriformis stretch 4x30"                             Exercise Diary              TA activation 10x10"            HS stretching 4x30"            Elliptical Warm up NV            Slant board gastroc stretch NV            Self sciatic nerve glides 2x10            Q-ped spinal neutral x20            Q-ped arm lifts x10 B/L            Q-ped leg lifts x10 B/L            Closed chain multifidus at counter 2x10 B/L            OTB TA presses 2x10 B/L                                                                                                                                                  Modalities

## 2018-04-18 ENCOUNTER — OFFICE VISIT (OUTPATIENT)
Dept: PHYSICAL THERAPY | Facility: REHABILITATION | Age: 33
End: 2018-04-18
Payer: OTHER MISCELLANEOUS

## 2018-04-18 DIAGNOSIS — M76.31 ILIOTIBIAL BAND SYNDROME OF RIGHT SIDE: ICD-10-CM

## 2018-04-18 DIAGNOSIS — M51.9 SCHMORL'S NODE: Primary | ICD-10-CM
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
## 2018-04-18 DIAGNOSIS — M51.17 INTERVERTEBRAL DISC DISORDERS WITH RADICULOPATHY, LUMBOSACRAL REGION: ICD-10-CM

## 2018-04-18 PROCEDURE — 97140 MANUAL THERAPY 1/> REGIONS: CPT | Performed by: PHYSICAL THERAPIST

## 2018-04-18 PROCEDURE — 97110 THERAPEUTIC EXERCISES: CPT | Performed by: PHYSICAL THERAPIST

## 2018-04-18 PROCEDURE — 97112 NEUROMUSCULAR REEDUCATION: CPT | Performed by: PHYSICAL THERAPIST

## 2018-04-18 NOTE — PROGRESS NOTES
Daily Note     Today's date: 2018  Patient name: Adele Goodwin  : 1985  MRN: [id number]  Referring provider: Em Sanchez DO  Dx:   Encounter Diagnosis     ICD-10-CM    1  Schmorl's node M51 9    2  Intervertebral disc disorders with radiculopathy, lumbosacral region M51 17    3  Work related injury Y99 0    4  Iliotibial band syndrome of right side M76 31                   Subjective: Patient reports reduced pain since IE and states that she was compliant with her HEP but is having difficulty finding her neutral spine  Objective: See treatment diary below      Assessment: Tolerated treatment well  Patient demonstrated fatigue post treatment and exhibited good technique with therapeutic exercises  Patient demonstrated improved awareness of pelvic/lumbar compensations with TE but difficulty remains with closed chain multifidus at counter and pt was educated to continue with spinal neutral TE and added TA presses to HEP  Plan: Continue per plan of care       Precautions: none    Daily Treatment Diary     Manual              S/L lumbar gapping (l5/S1) 5'            Paraspinal STM in S/L 5'            Sciatic nerve glides [id number]"            Piriformis stretch 4x30"                             Exercise Diary              TA activation             HS stretching 4x30"            Elliptical Warm up Bike - 8'            Slant board gastroc stretch 4x30"            Self sciatic nerve glides 2x10            Q-ped bird dogs On physioball  x2 fatigue            Q-ped arm lifts 2'            Q-ped leg lifts x10 B/L            Closed chain multifidus at counter 2x10 B/L            TB TA presses GTB 2x1 B/L                                                                                                                                                  Modalities

## 2018-04-25 ENCOUNTER — OFFICE VISIT (OUTPATIENT)
Dept: PHYSICAL THERAPY | Facility: REHABILITATION | Age: 33
End: 2018-04-25
Payer: OTHER MISCELLANEOUS

## 2018-04-25 DIAGNOSIS — M51.9 SCHMORL'S NODE: Primary | ICD-10-CM

## 2018-04-25 DIAGNOSIS — M51.17 INTERVERTEBRAL DISC DISORDERS WITH RADICULOPATHY, LUMBOSACRAL REGION: ICD-10-CM

## 2018-04-25 DIAGNOSIS — M76.31 ILIOTIBIAL BAND SYNDROME OF RIGHT SIDE: ICD-10-CM

## 2018-04-25 PROCEDURE — 97110 THERAPEUTIC EXERCISES: CPT | Performed by: PHYSICAL THERAPIST

## 2018-04-25 PROCEDURE — 97112 NEUROMUSCULAR REEDUCATION: CPT | Performed by: PHYSICAL THERAPIST

## 2018-04-25 PROCEDURE — 97140 MANUAL THERAPY 1/> REGIONS: CPT | Performed by: PHYSICAL THERAPIST

## 2018-04-25 NOTE — PROGRESS NOTES
Daily Note     Today's date: 2018  Patient name: Ginny Garcia  : 1985  MRN: [id number]  Referring provider: Guille Brandt DO  Dx:   Encounter Diagnosis     ICD-10-CM    1  Schmorl's node M51 9    2  Iliotibial band syndrome of right side M76 31    3  Intervertebral disc disorders with radiculopathy, lumbosacral region M51 17        Start Time: 1550  Stop Time: 1630  Total time in clinic (min): 40 minutes    Subjective: Patient reports feeling less pain and tightness in R hip and back  Objective: See treatment diary below      Assessment: Tolerated treatment fair  Patient with TTP with light to moderate touch over LS SP's  Changed from LS gapping to distraction in side lying due to improved tolerance  TTP also over piriformis (mod-severe)  + SLR at 50% ROM on R  Able to improve ~ 10 deg after nerve glides  Poor control with q-ped hip ext, R>L  Patient educated on how to reverse LS curve in sitting  Plan: Continue per plan of care       Precautions: none    Daily Treatment Diary     Manual              S/L lumbar distraction (L5/S1) on R 5'            Paraspinal STM in S/L 5'            Sciatic nerve glides [id number]"            Piriformis stretch 4x30"                             Exercise Diary              TA activation +PPT 10"x10            HS stretching             Elliptical Warm up Bike - 8'            Slant board gastroc stretch             Self sciatic nerve glides 2x10            Q-ped bird dogs             Q-ped arm lifts 20x b/l            Q-ped leg lifts x10 B/L            Closed chain multifidus at counter             TB TA presses GTB 2x10 B/L            Seatd LS neutral with TA 10"x10                                                                                                                                     Modalities

## 2018-04-27 ENCOUNTER — OFFICE VISIT (OUTPATIENT)
Dept: PHYSICAL THERAPY | Facility: REHABILITATION | Age: 33
End: 2018-04-27
Payer: OTHER MISCELLANEOUS

## 2018-04-27 DIAGNOSIS — M51.17 INTERVERTEBRAL DISC DISORDERS WITH RADICULOPATHY, LUMBOSACRAL REGION: ICD-10-CM

## 2018-04-27 DIAGNOSIS — M76.31 ILIOTIBIAL BAND SYNDROME OF RIGHT SIDE: Primary | ICD-10-CM

## 2018-04-27 PROCEDURE — 97112 NEUROMUSCULAR REEDUCATION: CPT

## 2018-04-27 PROCEDURE — 97140 MANUAL THERAPY 1/> REGIONS: CPT

## 2018-04-27 PROCEDURE — 97110 THERAPEUTIC EXERCISES: CPT

## 2018-04-27 NOTE — PROGRESS NOTES
Daily Note     Today's date: 2018  Patient name: Gisele Melgar  : 1985  MRN: [id number]  Referring provider: Yaneli Singh DO  Dx:   Encounter Diagnosis     ICD-10-CM    1  Iliotibial band syndrome of right side M76 31    2  Intervertebral disc disorders with radiculopathy, lumbosacral region M51 17                   Subjective: Patient reports continued pain down her R leg  She states that the pain was slightly decreased after last session, but came back by the next day  Objective: See treatment diary below      Assessment: Tolerated treatment well  She had no complaints of pain during session  Some relief of tight musculature with stretching and manual therapy noted  Pt reports to be compliant with her HEP  Plan: Continue per plan of care       Precautions: none    Daily Treatment Diary     Manual             S/L lumbar distraction (L5/S1) on R 5'            Paraspinal STM in S/L 5' 8'           Sciatic nerve glides [id number]" [id number]"           Piriformis stretch 4x30" 4x30"                            Exercise Diary             TA activation +PPT 10"x10 10"x  10           HS stretching  30"x4           Elliptical Warm up Bike - 8' Bike-  8'           Slant board gastroc stretch  30"x4           Self sciatic nerve glides 2x10 2x10           Q-ped bird dogs             Q-ped arm lifts 20x b/l 20x b/l           Q-ped leg lifts x10 B/L x10  B/L           Closed chain multifidus at counter             TB TA presses GTB 2x10 B/L GTB  2x10  B/L           Seatd LS neutral with TA 10"x10 10"x  10                                                                                                                                    Modalities

## 2018-04-30 ENCOUNTER — OFFICE VISIT (OUTPATIENT)
Dept: PHYSICAL THERAPY | Facility: REHABILITATION | Age: 33
End: 2018-04-30
Payer: OTHER MISCELLANEOUS
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
## 2018-04-30 DIAGNOSIS — M51.9 SCHMORL'S NODE: ICD-10-CM

## 2018-04-30 DIAGNOSIS — M51.17 INTERVERTEBRAL DISC DISORDERS WITH RADICULOPATHY, LUMBOSACRAL REGION: ICD-10-CM

## 2018-04-30 DIAGNOSIS — M76.31 ILIOTIBIAL BAND SYNDROME OF RIGHT SIDE: Primary | ICD-10-CM

## 2018-04-30 PROCEDURE — 97140 MANUAL THERAPY 1/> REGIONS: CPT | Performed by: PHYSICAL THERAPIST

## 2018-04-30 PROCEDURE — 97112 NEUROMUSCULAR REEDUCATION: CPT | Performed by: PHYSICAL THERAPIST

## 2018-04-30 PROCEDURE — 97110 THERAPEUTIC EXERCISES: CPT | Performed by: PHYSICAL THERAPIST

## 2018-04-30 NOTE — PROGRESS NOTES
Daily Note     Today's date: 2018  Patient name: Connie Hawkins  : 1985  MRN: [id number]  Referring provider: Rene Garcia DO  Dx:   Encounter Diagnosis     ICD-10-CM    1  Iliotibial band syndrome of right side M76 31    2  Intervertebral disc disorders with radiculopathy, lumbosacral region M51 17    3  Schmorl's node M51 9    4  Work related injury Y99 0                   Subjective: Patient reports having minimal pain pre-tx but states that she has increased right buttock pain from prolonged sitting on her car ride that she states is radiating from her lumbar spine  Objective: See treatment diary below      Assessment: Tolerated treatment well  Patient demonstrated fatigue post treatment and exhibited good technique with therapeutic exercises  Frequent VC's/TC's remains required to prevent pelvic compensations with stabilization  Patient demonstrated significant difficulty with B/L LE       Plan: Continue per plan of care       Precautions: none    Daily Treatment Diary     Manual              S/L lumbar gapping (L5/S1) on R 3'            Paraspinal STM in S/L 5'            Sciatic nerve glides NP            Piriformis stretch 4x30"                             Exercise Diary              TA activation +PPT 10"x10            HS stretching             Elliptical Warm up Bike - 8'            Slant board gastroc stretch             Self sciatic nerve glides 2x10            Q-ped bird dogs             Q-ped arm lifts             Q-ped leg lifts             Closed chain multifidus at counter             TB TA presses GTB 2x10 B/L            Seatd LS neutral with TA 10"x10            TA + B/L LE lift 3x10                                                                                                                        Modalities

## 2018-05-02 ENCOUNTER — OFFICE VISIT (OUTPATIENT)
Dept: PHYSICAL THERAPY | Facility: REHABILITATION | Age: 33
End: 2018-05-02
Payer: OTHER MISCELLANEOUS

## 2018-05-02 DIAGNOSIS — M51.9 SCHMORL'S NODE: ICD-10-CM

## 2018-05-02 DIAGNOSIS — M76.31 ILIOTIBIAL BAND SYNDROME OF RIGHT SIDE: Primary | ICD-10-CM

## 2018-05-02 DIAGNOSIS — M51.17 INTERVERTEBRAL DISC DISORDERS WITH RADICULOPATHY, LUMBOSACRAL REGION: ICD-10-CM
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
## 2018-05-02 PROCEDURE — 97140 MANUAL THERAPY 1/> REGIONS: CPT | Performed by: PHYSICAL THERAPIST

## 2018-05-02 PROCEDURE — 97112 NEUROMUSCULAR REEDUCATION: CPT | Performed by: PHYSICAL THERAPIST

## 2018-05-02 PROCEDURE — 97110 THERAPEUTIC EXERCISES: CPT | Performed by: PHYSICAL THERAPIST

## 2018-05-02 NOTE — PROGRESS NOTES
Daily Note     Today's date: 2018  Patient name: Sofia Escalante  : 1985  MRN: [id number]  Referring provider: Ceasar Krause DO  Dx:   Encounter Diagnosis     ICD-10-CM    1  Iliotibial band syndrome of right side M76 31    2  Intervertebral disc disorders with radiculopathy, lumbosacral region M51 17    3  Schmorl's node M51 9    4  Work related injury Y99 0                   Subjective: Patient reports having minimal pain pre-tx and states that she tried to add TA + LE lifts to HEP but demonstrated difficulty and discontinued  Objective: See treatment diary below      Assessment: Tolerated treatment well  Patient demonstrated fatigue post treatment and exhibited good technique with therapeutic exercises  Added TA biofeedback with LE lifts and patient tolerated well but significant fatigue and loss of activation on eccentric phase  Progressed TE as noted below  Plan: Continue per plan of care  Precautions: none    Daily Treatment Diary     Manual  5/2            S/L lumbar gapping (L5/S1) on R 3'            Paraspinal STM in S/L 5'            Sciatic nerve glides NP            Piriformis stretch 4x30"                             Exercise Diary  5/2            TA activation +PPT biofeedback  10x10"            HS stretching             Elliptical Warm up Bike - 8'            Slant board gastroc stretch             Self sciatic nerve glides             Q-ped bird dogs             Q-ped arm lifts             Q-ped leg lifts             Closed chain multifidus at counter             TB TA presses BTB 2x10 B/L            Seatd LS neutral with TA             TA + B/L LE lift 3x10 + biofeedback            TA + shoulder ext    GTB  2x10x3"                                                                                                           Modalities

## 2018-05-03 NOTE — PROGRESS NOTES
Assessment/Plan:      Diagnoses and all orders for this visit:    Work related injury    Spinal stenosis of lumbar region with neurogenic claudication    Schmorl's node    Intervertebral disc disorders with radiculopathy, lumbosacral region      Discussion:  She has been offered surgery by Natan Hall at [facility]  She is undecided  She had seen Dr Ld Dewey in the past but is not seen her since her most recent scanning in the fall 2017  She was advised today did bring her disc with her wall abdomen re-evaluate her  Spinal cord stimulation may be a possibility but has yet to be discussed with her by pain medicine  She was advised today to continue physical therapy until she plateaus  Follow-up with me in 4 weeks  Subjective:     Patient ID: Connie Hawkins is a 27 y o  female  HPI Follow-up last seen April 6th at that time she was sent for a short course of physical therapy to review her home exercise program , trial try segment medication for pain and sleep she has been seen by Neurosurgery at [facility] and surgery was offered  Feels PT has "released the pressure" in her right hip and leg  Has not decided on surgery at this time  Has court date for WC  Has no job at this time  Has  and settlement being negotiated  Today s patern is right LB and hip  Review of Systems   Gastrointestinal: Negative  Genitourinary: Negative  Skin: Positive for rash  Objective:    PTx Notes:  Subjective: Patient reports having minimal pain pre-tx and states that she tried to add TA + LE lifts to HEP but demonstrated difficulty and discontinue  Objective: See treatment diary below  Assessment: Tolerated treatment well  Patient demonstrated fatigue post treatment and exhibited good technique with therapeutic exercises  Added TA biofeedback with LE lifts and patient tolerated well but significant fatigue and loss of activation on eccentric phase  Progressed TE as noted below     Plan: Continue per plan of care     NS Note:    Had extensive conversation with patient  MRI LSS 10/2017 personally reviewed  L5-S1 DDD, spondylosis, disc  Have discussed risks, benefits of continued conservative measures vs surgery  Have explained rationale of posterior decompression vs posterior decompression with fusion  Rationale of smoking cessation also discussed  She expressed full understanding  She will call us if she wants to pursue surgery  All questions answered  Physical Exam   Neurological:   Reflex Scores:       Tricep reflexes are 1+ on the right side and 1+ on the left side  Bicep reflexes are 1+ on the right side and 1+ on the left side  Brachioradialis reflexes are 1+ on the right side and 1+ on the left side  Patellar reflexes are 1+ on the left side  Achilles reflexes are 0 on the right side

## 2018-05-04 ENCOUNTER — OFFICE VISIT (OUTPATIENT)
Dept: PHYSICAL THERAPY | Facility: REHABILITATION | Age: 33
End: 2018-05-04
Payer: OTHER MISCELLANEOUS

## 2018-05-04 ENCOUNTER — OFFICE VISIT (OUTPATIENT)
Dept: PAIN MEDICINE | Facility: CLINIC | Age: 33
End: 2018-05-04
Payer: OTHER MISCELLANEOUS

## 2018-05-04 VITALS
BODY MASS INDEX: 40.97 KG/M2 | DIASTOLIC BLOOD PRESSURE: 80 MMHG | HEART RATE: 80 BPM | HEIGHT: 64 IN | WEIGHT: 240 LBS | SYSTOLIC BLOOD PRESSURE: 110 MMHG

## 2018-05-04 DIAGNOSIS — M51.17 INTERVERTEBRAL DISC DISORDERS WITH RADICULOPATHY, LUMBOSACRAL REGION: ICD-10-CM

## 2018-05-04 DIAGNOSIS — M48.062 SPINAL STENOSIS OF LUMBAR REGION WITH NEUROGENIC CLAUDICATION: ICD-10-CM
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
## 2018-05-04 DIAGNOSIS — M51.9 SCHMORL'S NODE: ICD-10-CM

## 2018-05-04 DIAGNOSIS — M76.31 ILIOTIBIAL BAND SYNDROME OF RIGHT SIDE: Primary | ICD-10-CM

## 2018-05-04 PROCEDURE — 97112 NEUROMUSCULAR REEDUCATION: CPT | Performed by: PHYSICAL THERAPIST

## 2018-05-04 PROCEDURE — 97140 MANUAL THERAPY 1/> REGIONS: CPT | Performed by: PHYSICAL THERAPIST

## 2018-05-04 PROCEDURE — 99213 OFFICE O/P EST LOW 20 MIN: CPT | Performed by: PHYSICAL MEDICINE & REHABILITATION

## 2018-05-04 PROCEDURE — 97110 THERAPEUTIC EXERCISES: CPT | Performed by: PHYSICAL THERAPIST

## 2018-05-04 NOTE — LETTER
May 4, 2018     Esther Dodson DO  [address] [facility] [address] [place] [address]    Patient: Frank Whitlock   YOB: 1985   Date of Visit: 5/4/2018       Dear Dr Rao Organ:    Thank you for referring Frank Whitlock to me for evaluation  Below are the relevant portions of my assessment and plan of care  Diagnoses and all orders for this visit:    Work related injury    Spinal stenosis of lumbar region with neurogenic claudication    Schmorl's node    Intervertebral disc disorders with radiculopathy, lumbosacral region        If you have questions, please do not hesitate to call me  I look forward to following Angie along with you           Sincerely,        Alanna Mcrae DO        CC: No Recipients

## 2018-05-04 NOTE — PROGRESS NOTES
Daily Note     Today's date: 2018  Patient name: Norma Loyd  : 1985  MRN: [id number]  Referring provider: Radah Joel DO  Dx:   Encounter Diagnosis     ICD-10-CM    1  Iliotibial band syndrome of right side M76 31    2  Intervertebral disc disorders with radiculopathy, lumbosacral region M51 17    3  Schmorl's node M51 9    4  Work related injury Y99 0                   Subjective: Patient reports having minimal pain pre-tx and states that she continues with her HEP compliance  Objective: See treatment diary below      Assessment: Tolerated treatment well  Patient demonstrated fatigue post treatment and exhibited good technique with therapeutic exercises  Significant carryover improvement with TA activation and control with B/L LE lifts demonstrating improve neuromuscular control  Plan: Continue per plan of care       Precautions: none    Daily Treatment Diary     Manual  5/4            S/L lumbar gapping (L5/S1) on R 3'            Paraspinal STM in S/L 5'            Sciatic nerve glides NP            Piriformis stretch 4x30"                             Exercise Diary  5/2            TA activation +PPT biofeedback  10x10"            HS stretching 4x30"            Elliptical Warm up Bike - 8'            Slant board gastroc stretch             Self sciatic nerve glides             Q-ped bird dogs             Q-ped arm lifts             Q-ped leg lifts             Closed chain multifidus at counter             TB TA presses BTB 3x10 B/L on physioball            Seatd LS neutral with TA             TA + B/L LE lift 3x10 + biofeedback            TA + shoulder ext  + single leg GTB  2x10x3" on physioball                                                                                                           Modalities

## 2018-05-07 ENCOUNTER — OFFICE VISIT (OUTPATIENT)
Dept: PHYSICAL THERAPY | Facility: REHABILITATION | Age: 33
End: 2018-05-07
Payer: OTHER MISCELLANEOUS
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
## 2018-05-07 DIAGNOSIS — M51.17 INTERVERTEBRAL DISC DISORDERS WITH RADICULOPATHY, LUMBOSACRAL REGION: ICD-10-CM

## 2018-05-07 DIAGNOSIS — M48.062 SPINAL STENOSIS OF LUMBAR REGION WITH NEUROGENIC CLAUDICATION: ICD-10-CM

## 2018-05-07 DIAGNOSIS — M76.31 ILIOTIBIAL BAND SYNDROME OF RIGHT SIDE: Primary | ICD-10-CM

## 2018-05-07 DIAGNOSIS — M51.9 SCHMORL'S NODE: ICD-10-CM

## 2018-05-07 PROCEDURE — G8991 OTHER PT/OT GOAL STATUS: HCPCS | Performed by: PHYSICAL THERAPIST

## 2018-05-07 PROCEDURE — 97110 THERAPEUTIC EXERCISES: CPT | Performed by: PHYSICAL THERAPIST

## 2018-05-07 PROCEDURE — G8990 OTHER PT/OT CURRENT STATUS: HCPCS | Performed by: PHYSICAL THERAPIST

## 2018-05-07 PROCEDURE — 97112 NEUROMUSCULAR REEDUCATION: CPT | Performed by: PHYSICAL THERAPIST

## 2018-05-07 PROCEDURE — 97140 MANUAL THERAPY 1/> REGIONS: CPT | Performed by: PHYSICAL THERAPIST

## 2018-05-07 NOTE — PROGRESS NOTES
Daily Note     Today's date: 2018  Patient name: Jaimee Garibay  : 1985  MRN: [id number]  Referring provider: Ryan Solo DO  Dx:   Encounter Diagnosis     ICD-10-CM    1  Iliotibial band syndrome of right side M76 31    2  Intervertebral disc disorders with radiculopathy, lumbosacral region M51 17    3  Schmorl's node M51 9    4  Work related injury Y99 0                   Subjective: Patient reports ability to stand for 1 hour prior to onset of symptoms (pre-PT was 15 minutes)  Patient also reports improved ability to stand from the floor and ambulate longer distances  Objective: See treatment diary below      Assessment: Tolerated treatment well  Patient demonstrated fatigue post treatment and exhibited good technique with therapeutic exercises  Patient demonstrating improvement in mobility and function as per FOTO scoring  Progressed core stability with proprioceptive training and pt tolerated well  Plan: Continue per plan of care       Precautions: none    Daily Treatment Diary     Manual              S/L lumbar gapping (L5/S1) on R 3'            Paraspinal STM in S/L 5'            Sciatic nerve glides NP            Piriformis stretch 4x30"                             Exercise Diary              TA activation +PPT biofeedback  10x10"            HS stretching 4x30"            Bike/TM 5'/5'            Slant board gastroc stretch 4x30"            Self sciatic nerve glides             Q-ped bird dogs             Q-ped arm lifts             Q-ped leg lifts             Closed chain multifidus at counter             TB TA presses             Seatd LS neutral with TA             TA + B/L LE lift 3x10 + biofeedback            TA + shoulder ext  + single leg BTB  2x10x3" on physioball            Seated multifidus proprioception x3 fatigue                                                                                              Modalities

## 2018-05-09 ENCOUNTER — OFFICE VISIT (OUTPATIENT)
Dept: PHYSICAL THERAPY | Facility: REHABILITATION | Age: 33
End: 2018-05-09
Payer: OTHER MISCELLANEOUS

## 2018-05-09 DIAGNOSIS — M48.062 SPINAL STENOSIS OF LUMBAR REGION WITH NEUROGENIC CLAUDICATION: ICD-10-CM

## 2018-05-09 DIAGNOSIS — M51.9 SCHMORL'S NODE: ICD-10-CM

## 2018-05-09 DIAGNOSIS — M51.17 INTERVERTEBRAL DISC DISORDERS WITH RADICULOPATHY, LUMBOSACRAL REGION: Primary | ICD-10-CM
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
## 2018-05-09 PROCEDURE — 97112 NEUROMUSCULAR REEDUCATION: CPT | Performed by: PHYSICAL THERAPIST

## 2018-05-09 PROCEDURE — 97110 THERAPEUTIC EXERCISES: CPT | Performed by: PHYSICAL THERAPIST

## 2018-05-09 PROCEDURE — 97140 MANUAL THERAPY 1/> REGIONS: CPT | Performed by: PHYSICAL THERAPIST

## 2018-05-09 NOTE — PROGRESS NOTES
Daily Note     Today's date: 2018  Patient name: Kimberly Arizmendi  : 1985  MRN: [id number]  Referring provider: Lenin Figueredo DO  Dx:   Encounter Diagnosis     ICD-10-CM    1  Intervertebral disc disorders with radiculopathy, lumbosacral region M51 17    2  Schmorl's node M51 9    3  Work related injury Y99 0    4  Spinal stenosis of lumbar region with neurogenic claudication [id number] [id number]                   Subjective: Patient reports having minimal lumbar discomfort pre-tx and states that she has been compliant with her HEP  Patient states that her discomfort has no longer been present with ambulation and mobility  Objective: See treatment diary below      Assessment: Tolerated treatment well  Patient demonstrated fatigue post treatment and exhibited good technique with therapeutic exercises  Palpation reveals moderate lumbosacral muscle tension  Progressed resistance with q-ped core stability and pt tolerated well without compensation  Plan: Continue per plan of care       Precautions: none    Daily Treatment Diary     Manual              S/L lumbar gapping (L5/S1) on R 3'            Paraspinal STM in S/L 5'            Sciatic nerve glides NP            Piriformis stretch 4x30"                             Exercise Diary              TA activation +PPT             HS stretching 4x30"            Bike/TM 5'/5'            Slant board gastroc stretch 4x30"            Self sciatic nerve glides             Q-ped bird dogs + GTB  2x10 B/L            Q-ped arm lifts             Q-ped leg lifts             Closed chain multifidus at counter             TB TA presses             Seatd LS neutral with TA             TA + B/L LE lift 3x10 + biofeedback            TA + shoulder ext  + single leg             Seated multifidus proprioception x3 fatigue                                                                                              Modalities

## 2018-05-11 ENCOUNTER — OFFICE VISIT (OUTPATIENT)
Dept: PHYSICAL THERAPY | Facility: REHABILITATION | Age: 33
End: 2018-05-11
Payer: OTHER MISCELLANEOUS

## 2018-05-11 DIAGNOSIS — M48.062 SPINAL STENOSIS OF LUMBAR REGION WITH NEUROGENIC CLAUDICATION: ICD-10-CM

## 2018-05-11 DIAGNOSIS — M51.9 SCHMORL'S NODE: ICD-10-CM

## 2018-05-11 DIAGNOSIS — M76.31 ILIOTIBIAL BAND SYNDROME OF RIGHT SIDE: ICD-10-CM

## 2018-05-11 DIAGNOSIS — M51.17 INTERVERTEBRAL DISC DISORDERS WITH RADICULOPATHY, LUMBOSACRAL REGION: Primary | ICD-10-CM
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
## 2018-05-11 PROCEDURE — 97112 NEUROMUSCULAR REEDUCATION: CPT | Performed by: PHYSICAL THERAPIST

## 2018-05-11 PROCEDURE — 97110 THERAPEUTIC EXERCISES: CPT | Performed by: PHYSICAL THERAPIST

## 2018-05-11 PROCEDURE — G8991 OTHER PT/OT GOAL STATUS: HCPCS | Performed by: PHYSICAL THERAPIST

## 2018-05-11 PROCEDURE — 97140 MANUAL THERAPY 1/> REGIONS: CPT | Performed by: PHYSICAL THERAPIST

## 2018-05-11 PROCEDURE — G8992 OTHER PT/OT  D/C STATUS: HCPCS | Performed by: PHYSICAL THERAPIST

## 2018-05-11 NOTE — PROGRESS NOTES
PT Re-Evaluation  and PT Discharge    Today's date: 2018  Patient name: Kartik Little  : 1985  MRN: [id number]  Referring provider: Bebo Storey DO  Dx:   Encounter Diagnosis     ICD-10-CM    1  Intervertebral disc disorders with radiculopathy, lumbosacral region M51 17    2  Schmorl's node M51 9    3  Work related injury Y99 0    4  Spinal stenosis of lumbar region with neurogenic claudication M48 062    5  Iliotibial band syndrome of right side M76 31                   Assessment  Impairments: abnormal gait, abnormal muscle firing, activity intolerance, lacks appropriate home exercise program and pain with function    Assessment details: Patient is a 27y o  year old female who attended physical therapy for 11 treatment sessions regarding LBP  Patient reports 80% improvement at this time which correlates with FOTO scoring  Patient has shown improvement throughout PT by demonstrating decreased pain, increased range of motion, increased strength and improved tolerance to activity  Secondary to achieving functional goals and independence with comprehensive Home Exercise Program, Fernanda Dior will be discharged from PT at this time  Thank you  Understanding of Dx/Px/POC: good   Prognosis: good    Goals  STG;  Improve muscle strength 4/5 = MET  Improve AROM lumbar spine and hip by 20% = MET  Decrease pain 5/10 with activity = MET  Ambulate without pain for 10 minutes = MET  LTG  Improve MS to 5/5 L/S and Right LE = PARTIALLY MET  Increase AROM to [facility] = PARTIALLY MET  Decrease pain with activity 0-1/10 = PARTIALLY MET  Patient will be able to ambulate 20 minutes without increase in pain = MET    Plan  Planned therapy interventions: home exercise program and postural training  Treatment plan discussed with: patient  Plan details: Patient will be d/c from PT at this time           Subjective Evaluation    History of Present Illness  Date of onset: 2016  Mechanism of injury: trauma  Mechanism of injury: 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  Later on she started feeling more and more lower back pain 2016  She is being followed by pain specialist and she reports that her symptoms are worsening  Patient reports she has gain 31 lbs since incident  2018: Patient reports full compliance with her HEP and states that she has noticed a significant reduction in lumbar pain  Recurrent probem    Quality of life: good    Pain  Current pain ratin  At best pain ratin  At worst pain ratin  Location: Right lower back radiating towards foot  Quality: throbbing and discomfort  Relieving factors: heat, medications and change in position  Aggravating factors: walking and lifting  Progression: worsening    Social Support  Steps to enter house: yes  Stairs in house: yes   Lives in: multiple-level home  Lives with: young children    Employment status: not working  Hand dominance: right      Diagnostic Tests  MRI studies: abnormal (disorder)  Treatments  Previous treatment: physical therapy and injection treatment  Patient Goals  Patient goals for therapy: return to work, decreased pain, increased strength and independence with ADLs/IADLs  Patient goal: I want to be able to walk         Objective     Palpation   Left   Tenderness of the lumbar paraspinals  Right   Muscle spasm in the gluteus tiffany  Tenderness of the gluteus tiffany and lumbar paraspinals       Active Range of Motion     Lumbar   Flexion: 90 (Right hip pain) degrees   Extension: 75 degrees   Left lateral flexion: 90 degrees   Right lateral flexion: 90 (Righ hip pain) degrees   Left rotation: 90 degrees   Right rotation: 90 degrees   Left Hip   Flexion: 110 degrees   Extension: 5 degrees   Abduction: WFL  External rotation (90/90): 30 degrees   Internal rotation (90/90): 35 degrees     Right Hip   Flexion: 110 degrees   Extension: 5 degrees   External rotation (90/90): 30 degrees with pain  Internal rotation (90/90): 35 degrees with pain  Left Knee   Flexion: 120 degrees   Extension: 0 degrees     Right Knee   Flexion: 120 degrees   Extension: 0 degrees     Additional Active Range of Motion Details  *Numbers above are a percentage    MMT:  TA: 3+/5  Multifidus: 4-/5    Strength/Myotome Testing     Left Hip   Planes of Motion   Flexion: 4+  Extension: 4+  Abduction: 4+  Adduction: 4+  External rotation: 5  Internal rotation: 5    Isolated Muscles   Gluteus tiffany: 4  Iliopsoas: 5    Right Hip   Planes of Motion   Flexion: 4+  Extension: 4+  Abduction: 4+  Adduction: 4+  External rotation: 5  Internal rotation: 5    Isolated Muscles   Gluteus maximums: 4  Iliopsoas: 5    Ambulation     Ambulation: Level Surfaces   Ambulation without assistive device: independent    Additional Level Surfaces Ambulation Details  Patient presents with decreased stance phase on Right, increased lateral displacement towards left and decreased step length (RESOLVED on 05/11/2018)    Quality of Movement During Gait     Pelvis  Anterior pelvic tilt         Flowsheet Rows      Most Recent Value   PT/OT G-Codes   Current Score  52   Projected Score  43   FOTO information reviewed  Yes   Assessment Type  Discharge   G code set  Other PT/OT Primary   Other PT Primary Goal Status ()  CK   Other PT Primary Discharge Status ()  CK       Precautions: none    Daily Treatment Diary     Manual  5/11            S/L lumbar gapping (L5/S1) on R 3'            Paraspinal STM in S/L 5'            Sciatic nerve glides NP            Piriformis stretch 4x30"            Reassessment 15'                Exercise Diary  5/11            TA activation +PPT             HS stretching 4x30"            Bike/TM 5'/5'            Slant board gastroc stretch 4x30"            Self sciatic nerve glides             Q-ped bird dogs + GTB  2x10 B/L            Q-ped arm lifts             Q-ped leg lifts             Closed chain multifidus at counter             TB TA presses Seatd LS neutral with TA             TA + B/L LE lift 3x10 + biofeedback            TA + shoulder ext  + single leg             Seated multifidus proprioception x3 fatigue                                                                                              Modalities

## 2018-05-31 NOTE — PROGRESS NOTES
Assessment/Plan:      Diagnoses and all orders for this visit:    Work related injury    Spinal stenosis of lumbar region with neurogenic claudication    Schmorl's node    Intervertebral disc disorders with radiculopathy, lumbosacral region          Subjective:     Patient ID: Sania Harry is a 27 y o  female  HPI Follow up for work related injury  This patient has at all times during my care been  released to restricted duty at her place of employment  She has since had her employment terminated  She remains with ongoing symptoms of right-sided back pain and radicular symptoms in the right leg, she was attending physical therapy when I last saw her May 4th, she had been offered spine surgery at [facility] by doctor Chapo Arias  She was referred back to Neurosurgery here for 2nd opinion with comparison of imaging  Today reporting improvement with PT and has HEP, but continues to have radicular Sx in medial right calf  Has  and commutation of claim is being negotiated  She is looking for work  Review of Systems   Respiratory: Negative  Cardiovascular: Negative  Gastrointestinal: Negative  Endocrine: Negative  Objective:  PTx Notes:    Assessment details: Patient is a 27y o  year old female who attended physical therapy for 11 treatment sessions regarding LBP  Patient reports 80% improvement at this time which correlates with FOTO scoring  Patient has shown improvement throughout PT by demonstrating decreased pain, increased range of motion, increased strength and improved tolerance to activity  Secondary to achieving functional goals and independence with comprehensive Home Exercise Program, Kee Tirado will be discharged from PT at this time  Thank you  Physical Exam   Constitutional: She appears well-developed and well-nourished  No distress  Musculoskeletal: She exhibits no edema or deformity     Neurological:   Reflex Scores:       Brachioradialis reflexes are 1+ on the right side and 1+ on the left side  Patellar reflexes are 1+ on the right side and 1+ on the left side  Achilles reflexes are 1+ on the right side and 1+ on the left side  Subjective loss of pin right versus left in L4 and L5 dermatomes

## 2018-06-01 ENCOUNTER — OFFICE VISIT (OUTPATIENT)
Dept: PAIN MEDICINE | Facility: CLINIC | Age: 33
End: 2018-06-01
Payer: OTHER MISCELLANEOUS

## 2018-06-01 VITALS
HEART RATE: 76 BPM | BODY MASS INDEX: 40.97 KG/M2 | HEIGHT: 64 IN | WEIGHT: 240 LBS | DIASTOLIC BLOOD PRESSURE: 80 MMHG | SYSTOLIC BLOOD PRESSURE: 106 MMHG

## 2018-06-01 DIAGNOSIS — M51.9 SCHMORL'S NODE: ICD-10-CM

## 2018-06-01 DIAGNOSIS — M51.17 INTERVERTEBRAL DISC DISORDERS WITH RADICULOPATHY, LUMBOSACRAL REGION: ICD-10-CM

## 2018-06-01 DIAGNOSIS — M48.062 SPINAL STENOSIS OF LUMBAR REGION WITH NEUROGENIC CLAUDICATION: ICD-10-CM
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
## 2018-06-01 PROCEDURE — 99213 OFFICE O/P EST LOW 20 MIN: CPT | Performed by: PHYSICAL MEDICINE & REHABILITATION

## 2018-06-01 NOTE — LETTER
June 1, 2018     Marlyn Beasley DO  [address] [facility] [address] [place] [address]    Patient: Rosalina Hawkins   YOB: 1985   Date of Visit: 6/1/2018       Dear Dr Hunter Fletcher:    Thank you for referring Rosalina Hawkins to me for evaluation  Below are the relevant portions of my assessment and plan of care  Diagnoses and all orders for this visit:    Work related injury    Spinal stenosis of lumbar region with neurogenic claudication    Schmorl's node    Intervertebral disc disorders with radiculopathy, lumbosacral region        If you have questions, please do not hesitate to call me  I look forward to following Martha Golden along with you           Sincerely,        Trevor Arauz DO        CC: No Recipients

## 2018-07-06 ENCOUNTER — OFFICE VISIT (OUTPATIENT)
Dept: PAIN MEDICINE | Facility: CLINIC | Age: 33
End: 2018-07-06
Payer: OTHER MISCELLANEOUS

## 2018-07-06 VITALS
WEIGHT: 247 LBS | HEART RATE: 84 BPM | HEIGHT: 64 IN | SYSTOLIC BLOOD PRESSURE: 120 MMHG | DIASTOLIC BLOOD PRESSURE: 88 MMHG | BODY MASS INDEX: 42.17 KG/M2
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
## 2018-07-06 DIAGNOSIS — M51.9 SCHMORL'S NODE: ICD-10-CM

## 2018-07-06 DIAGNOSIS — M51.17 INTERVERTEBRAL DISC DISORDERS WITH RADICULOPATHY, LUMBOSACRAL REGION: Primary | ICD-10-CM

## 2018-07-06 PROCEDURE — 99213 OFFICE O/P EST LOW 20 MIN: CPT | Performed by: PHYSICAL MEDICINE & REHABILITATION

## 2018-07-06 NOTE — LETTER
July 9, 2018     Marlyn Beasley DO  [address] [facility] [address] [place] [address]    Patient: Rosalina Hawkins   YOB: 1985   Date of Visit: 7/6/2018       Dear Dr Hunter Fletcher:    Thank you for referring Rosalina Hawkins to me for evaluation  Below are the relevant portions of my assessment and plan of care  Diagnoses and all orders for this visit:    Intervertebral disc disorders with radiculopathy, lumbosacral region    Schmorl's node    Work related injury        If you have questions, please do not hesitate to call me  I look forward to following Martha Golden along with you           Sincerely,        Trevor Arauz DO        CC: No Recipients

## 2018-07-06 NOTE — PROGRESS NOTES
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 was scheduled and canceled by her   At last visit she was improved "80%" according to physical therapy but she is remaining symptomatic in regards to the right leg radicular symptoms  We are awaiting a 2nd opinion from Neurosurgery which is scheduled next month  She had been taking ibuprofen but cause some GI side effects so she stopped that  Currently she is taking tumeric and tolerating that  She still has some abdominal discomfort has an appointment with her PCP next month  Review of Systems   Gastrointestinal:        No incontinence or retention   Genitourinary: Negative  No incontinence or retention   Musculoskeletal: Positive for back pain  Neurological: Positive for numbness  Objective:  NS Notes:  Marcelyn Leyden, MD - 02/08/2018 1:14 PM EST  Subjective:   Patient presents in consult  Had extensive conversation with patient  MRI LSS 10/2017 personally reviewed  L5-S1 DDD, spondylosis, disc  Have discussed risks, benefits of continued conservative measures vs surgery  Have explained rationale of posterior decompression vs posterior decompression with fusion  Rationale of smoking cessation also discussed  She expressed full understanding  She will call us if she wants to pursue surgery  All questions answered  Physical Exam   Constitutional: She appears well-developed and well-nourished  No distress  Neurological:   Reflex Scores:       Tricep reflexes are 1+ on the right side and 1+ on the left side  Bicep reflexes are 1+ on the right side and 1+ on the left side  Brachioradialis reflexes are 1+ on the right side and 1+ on the left side  Patellar reflexes are 1+ on the right side and 1+ on the left side  Achilles reflexes are 0 on the right side and 1+ on the left side  Right achilles diminished even with faciliattion  Data:  No MRI  report or images in our EMR

## 2018-08-09 ENCOUNTER — OFFICE VISIT (OUTPATIENT)
Dept: NEUROSURGERY | Facility: CLINIC | Age: 33
End: 2018-08-09
Payer: OTHER MISCELLANEOUS

## 2018-08-09 VITALS
TEMPERATURE: 97.6 F | DIASTOLIC BLOOD PRESSURE: 98 MMHG | RESPIRATION RATE: 20 BRPM | WEIGHT: 250.6 LBS | HEIGHT: 64 IN | SYSTOLIC BLOOD PRESSURE: 141 MMHG | HEART RATE: 64 BPM | BODY MASS INDEX: 42.78 KG/M2

## 2018-08-09 DIAGNOSIS — M48.062 SPINAL STENOSIS OF LUMBAR REGION WITH NEUROGENIC CLAUDICATION: ICD-10-CM

## 2018-08-09 DIAGNOSIS — M51.17 INTERVERTEBRAL DISC DISORDERS WITH RADICULOPATHY, LUMBOSACRAL REGION: ICD-10-CM
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
## 2018-08-09 PROCEDURE — 99214 OFFICE O/P EST MOD 30 MIN: CPT | Performed by: NEUROLOGICAL SURGERY

## 2018-08-09 RX ORDER — FERROUS SULFATE 325(65) MG
325 TABLET ORAL
COMMUNITY

## 2018-08-09 RX ORDER — DIPHENOXYLATE HYDROCHLORIDE AND ATROPINE SULFATE 2.5; .025 MG/1; MG/1
1 TABLET ORAL DAILY
COMMUNITY

## 2018-08-09 RX ORDER — UREA 10 %
100 LOTION (ML) TOPICAL DAILY
COMMUNITY

## 2018-08-09 NOTE — LETTER
August 9, 2018     Nahomi De JesusgeminiDO  [address] [id number] [facility]    Patient: Ginny Garcia   YOB: 1985   Date of Visit: 8/9/2018       Dear Dr Elsy Restrepo: Thank you for referring Ginny Garcia to me for evaluation  Below are my notes for this consultation  If you have questions, please do not hesitate to call me  I look forward to following your patient along with you  Sincerely,        Eryn Hernandez MD        CC: DO Isa Ji MD Elba Jubilee, MD  8/9/2018  1:09 PM  Sign at close encounter  Office Note - Neurosurgery   Ginny Garcia 26 y o  female MRN: [id number]      Assessment:    Patient is stable  58-year-old with chronic lower back and right-sided leg pain  Her pain is likely multifactorial   Given the duration of her symptoms and back dominant pain, I feel that it is unlikely that surgical intervention in the form of decompression or fusion is likely to help her primary pain complaint  She may wish to consider neuromodulation in the form of spinal cord stimulator trial will discuss this further with her pain specialist   She will follow up through this office on a p r n  basis  History, physical examination and diagnostic tests were reviewed and questions answered  Diagnosis, care plan and treatment options were discussed  The patient understand instructions and will follow up as directed  Plan:    Follow-up: prn    Problem List Items Addressed This Visit        Musculoskeletal and Integument    Intervertebral disc disorders with radiculopathy, lumbosacral region       Other    Work related injury    Spinal stenosis of lumbar region          Subjective/Objective     Chief Complaint    Lower back and right-sided leg pain  HPI    I last saw this pleasant 58-year-old woman in May of 2017 for complaints of lower back pain and right-sided leg pain  Unfortunately, these continue to bother her on a daily basis    She continues to describe her back pain which does not seem to be positional or activity related as her primary concern  The pain can radiate down the right leg with associated numbness and weakness when she is to active  She denies any pain, weakness or numbness in her left leg or difficulties with bowel bladder function or changing perineal sensation  Current pain medications are listed below  Epidural steroid injections and physical therapy of not been particularly helpful  She was not able to return to work with modified duties  She presents today for 2nd surgical opinion  ROS    Review of Systems   HENT: Negative  Eyes: Negative  Respiratory: Positive for shortness of breath (on exertion )  Cardiovascular: Negative  Gastrointestinal: Negative  Endocrine: Negative  Genitourinary: Negative  Musculoskeletal: Positive for back pain (right side lower back radiates into right buttock, hip, and down right leg)  Skin: Negative  Allergic/Immunologic: Negative  Neurological: Positive for weakness (right leg, tripping over right foot), numbness (right leg and foot) and headaches  Negative for dizziness, seizures and syncope  Hematological: Bruises/bleeds easily  Psychiatric/Behavioral: Positive for sleep disturbance (due to pain and discomfort )  Negative for confusion  Family History    History reviewed  No pertinent family history  Social History    Social History     Social History    Marital status: Single     Spouse name: N/A    Number of children: N/A    Years of education: N/A     Occupational History    Not on file       Social History Main Topics    Smoking status: Former Smoker     Types: Cigarettes     Quit date: 2/4/2018    Smokeless tobacco: Never Used    Alcohol use No    Drug use: No    Sexual activity: Not on file     Other Topics Concern    Not on file     Social History Narrative    No narrative on file       Past Medical History    Past Medical History:   Diagnosis Date    Back injury     Eczema        Surgical History    Past Surgical History:   Procedure Laterality Date    BACK SURGERY         Medications      Current Outpatient Prescriptions:     cetirizine (ZyrTEC) 10 mg tablet, Take 10 mg by mouth daily, Disp: , Rfl:     cyclobenzaprine (FLEXERIL) 5 mg tablet, Take one or two about an hour before going to bed every night , Disp: 30 tablet, Rfl: 0    diphenhydrAMINE (BENADRYL) 25 mg capsule, Take 1 capsule by mouth daily at bedtime as needed  , Disp: , Rfl:     ferrous sulfate 325 (65 Fe) mg tablet, Take 325 mg by mouth daily with breakfast, Disp: , Rfl:     ibuprofen (MOTRIN) 600 mg tablet, TAKE 1 TABLET BY MOUTH FOUR TIMES A DAY AS NEEDED, Disp: , Rfl:     multivitamin (THERAGRAN) TABS, Take 1 tablet by mouth daily, Disp: , Rfl:     vitamin B-12 (CYANOCOBALAMIN) 100 MCG tablet, Take 100 mcg by mouth daily, Disp: , Rfl:     Allergies    Allergies   Allergen Reactions    Nuts     Ciprofloxacin Other (See Comments)     Yeast infection       The following portions of the patient's history were reviewed and updated as appropriate: allergies, current medications, past family history, past medical history, past social history, past surgical history and problem list     Investigations    I personally reviewed the MRI results with the patient:    MRI of the lumbar spine without contrast dated March 23rd, 2017  Normal lumbar lordosis  Loss of disc height with severe degenerative disc disease at L5-S1 with right paracentral disc bulge impinging on the traversing S1 nerve root and mildly displacing it dorsally  No other areas of significant neural compression  Intrathecal contents unremarkable  Physical Exam    Vitals:  Blood pressure 141/98, pulse 64, temperature 97 6 °F (36 4 °C), resp  rate 20, height 5' 4" (1 626 m), weight 114 kg (250 lb 9 6 oz), last menstrual period 07/11/2018  ,Body mass index is 43 02 kg/m²      Physical Exam Constitutional: No distress  Obese  Musculoskeletal:        Lumbar back: She exhibits pain  She exhibits normal range of motion and no tenderness  Neurological: Gait normal    Skin: Skin is warm and dry  Psychiatric: She has a normal mood and affect  Her behavior is normal    Vitals reviewed  Neurologic Exam     Motor Exam   Right leg tone: normal  Left leg tone: normal5/5 power in lower extremities       Sensory Exam   Diminished light touch and pinprick sensation diffusely in the right leg a somewhat patchy distribution which is not clearly dermatomal      Gait, Coordination, and Reflexes     Gait  Gait: normal    Reflexes   Right ankle clonus: absent  Left ankle clonus: absent

## 2018-08-09 NOTE — PROGRESS NOTES
Office Note - Neurosurgery   Agnes Glynn 26 y o  female MRN: [id number]      Assessment:    Patient is stable  57-year-old with chronic lower back and right-sided leg pain  Her pain is likely multifactorial   Given the duration of her symptoms and back dominant pain, I feel that it is unlikely that surgical intervention in the form of decompression or fusion is likely to help her primary pain complaint  She may wish to consider neuromodulation in the form of spinal cord stimulator trial will discuss this further with her pain specialist   She will follow up through this office on a p r n  basis  History, physical examination and diagnostic tests were reviewed and questions answered  Diagnosis, care plan and treatment options were discussed  The patient understand instructions and will follow up as directed  Plan:    Follow-up: prn    Problem List Items Addressed This Visit        Musculoskeletal and Integument    Intervertebral disc disorders with radiculopathy, lumbosacral region       Other    Work related injury    Spinal stenosis of lumbar region          Subjective/Objective     Chief Complaint    Lower back and right-sided leg pain  HPI    I last saw this pleasant 57-year-old woman in May of 2017 for complaints of lower back pain and right-sided leg pain  Unfortunately, these continue to bother her on a daily basis  She continues to describe her back pain which does not seem to be positional or activity related as her primary concern  The pain can radiate down the right leg with associated numbness and weakness when she is to active  She denies any pain, weakness or numbness in her left leg or difficulties with bowel bladder function or changing perineal sensation  Current pain medications are listed below  Epidural steroid injections and physical therapy of not been particularly helpful  She was not able to return to work with modified duties    She presents today for 2nd surgical opinion  ROS    Review of Systems   HENT: Negative  Eyes: Negative  Respiratory: Positive for shortness of breath (on exertion )  Cardiovascular: Negative  Gastrointestinal: Negative  Endocrine: Negative  Genitourinary: Negative  Musculoskeletal: Positive for back pain (right side lower back radiates into right buttock, hip, and down right leg)  Skin: Negative  Allergic/Immunologic: Negative  Neurological: Positive for weakness (right leg, tripping over right foot), numbness (right leg and foot) and headaches  Negative for dizziness, seizures and syncope  Hematological: Bruises/bleeds easily  Psychiatric/Behavioral: Positive for sleep disturbance (due to pain and discomfort )  Negative for confusion  Family History    History reviewed  No pertinent family history  Social History    Social History     Social History    Marital status: Single     Spouse name: N/A    Number of children: N/A    Years of education: N/A     Occupational History    Not on file       Social History Main Topics    Smoking status: Former Smoker     Types: Cigarettes     Quit date: 2/4/2018    Smokeless tobacco: Never Used    Alcohol use No    Drug use: No    Sexual activity: Not on file     Other Topics Concern    Not on file     Social History Narrative    No narrative on file       Past Medical History    Past Medical History:   Diagnosis Date    Back injury     Eczema        Surgical History    Past Surgical History:   Procedure Laterality Date    BACK SURGERY         Medications      Current Outpatient Prescriptions:     cetirizine (ZyrTEC) 10 mg tablet, Take 10 mg by mouth daily, Disp: , Rfl:     cyclobenzaprine (FLEXERIL) 5 mg tablet, Take one or two about an hour before going to bed every night , Disp: 30 tablet, Rfl: 0    diphenhydrAMINE (BENADRYL) 25 mg capsule, Take 1 capsule by mouth daily at bedtime as needed  , Disp: , Rfl:     ferrous sulfate 325 (65 Fe) mg tablet, Take 325 mg by mouth daily with breakfast, Disp: , Rfl:     ibuprofen (MOTRIN) 600 mg tablet, TAKE 1 TABLET BY MOUTH FOUR TIMES A DAY AS NEEDED, Disp: , Rfl:     multivitamin (THERAGRAN) TABS, Take 1 tablet by mouth daily, Disp: , Rfl:     vitamin B-12 (CYANOCOBALAMIN) 100 MCG tablet, Take 100 mcg by mouth daily, Disp: , Rfl:     Allergies    Allergies   Allergen Reactions    Nuts     Ciprofloxacin Other (See Comments)     Yeast infection       The following portions of the patient's history were reviewed and updated as appropriate: allergies, current medications, past family history, past medical history, past social history, past surgical history and problem list     Investigations    I personally reviewed the MRI results with the patient:    MRI of the lumbar spine without contrast dated March 23rd, 2017  Normal lumbar lordosis  Loss of disc height with severe degenerative disc disease at L5-S1 with right paracentral disc bulge impinging on the traversing S1 nerve root and mildly displacing it dorsally  No other areas of significant neural compression  Intrathecal contents unremarkable  Physical Exam    Vitals:  Blood pressure 141/98, pulse 64, temperature 97 6 °F (36 4 °C), resp  rate 20, height 5' 4" (1 626 m), weight 114 kg (250 lb 9 6 oz), last menstrual period 07/11/2018  ,Body mass index is 43 02 kg/m²  Physical Exam   Constitutional: No distress  Obese  Musculoskeletal:        Lumbar back: She exhibits pain  She exhibits normal range of motion and no tenderness  Neurological: Gait normal    Skin: Skin is warm and dry  Psychiatric: She has a normal mood and affect  Her behavior is normal    Vitals reviewed  Neurologic Exam     Motor Exam   Right leg tone: normal  Left leg tone: normal5/5 power in lower extremities       Sensory Exam   Diminished light touch and pinprick sensation diffusely in the right leg a somewhat patchy distribution which is not clearly dermatomal  Gait, Coordination, and Reflexes     Gait  Gait: normal    Reflexes   Right ankle clonus: absent  Left ankle clonus: absent

## 2018-08-17 ENCOUNTER — OFFICE VISIT (OUTPATIENT)
Dept: PAIN MEDICINE | Facility: CLINIC | Age: 33
End: 2018-08-17
Payer: OTHER MISCELLANEOUS

## 2018-08-17 VITALS
BODY MASS INDEX: 42 KG/M2 | DIASTOLIC BLOOD PRESSURE: 88 MMHG | HEART RATE: 100 BPM | SYSTOLIC BLOOD PRESSURE: 120 MMHG | WEIGHT: 246 LBS | HEIGHT: 64 IN

## 2018-08-17 DIAGNOSIS — M51.17 INTERVERTEBRAL DISC DISORDERS WITH RADICULOPATHY, LUMBOSACRAL REGION: ICD-10-CM

## 2018-08-17 DIAGNOSIS — M51.9 SCHMORL'S NODE: ICD-10-CM

## 2018-08-17 DIAGNOSIS — M48.062 SPINAL STENOSIS OF LUMBAR REGION WITH NEUROGENIC CLAUDICATION: Primary | ICD-10-CM
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
## 2018-08-17 PROCEDURE — 99213 OFFICE O/P EST LOW 20 MIN: CPT | Performed by: PHYSICAL MEDICINE & REHABILITATION

## 2018-08-17 RX ORDER — COVID-19 ANTIGEN TEST
KIT MISCELLANEOUS
COMMUNITY

## 2018-08-17 NOTE — PROGRESS NOTES
Assessment/Plan:      Diagnoses and all orders for this visit:    Spinal stenosis of lumbar region with neurogenic claudication    Work related injury    Schmorl's node    Intervertebral disc disorders with radiculopathy, lumbosacral region          Subjective:     Patient ID: Norma Loyd is a 26 y o  female  HPI fOLLOW UP AFTER ns ov  Neuromodulation advised  Is now working in sedentary duty  Review of Systems   Musculoskeletal: Positive for back pain  Neurological: Positive for numbness  Negative for weakness  Objective:  NS notes:     Patient is stable      26year-old with chronic lower back and right-sided leg pain  Her pain is likely multifactorial   Given the duration of her symptoms and back dominant pain, I feel that it is unlikely that surgical intervention in the form of decompression or fusion is likely to help her primary pain complaint  She may wish to consider neuromodulation in the form of spinal cord stimulator trial will discuss this further with her pain specialist   She will follow up through this office on a p r n  basis  Electronically signed by Mirta Loco MD at 8/9/2018  1:09 PM     Physical Exam   Constitutional: She appears well-developed and well-nourished  No distress  Musculoskeletal:   Trace positive right straight leg raising  Able to stand on the toes bilaterally  Neurological: She displays no tremor  She exhibits normal muscle tone  Reflex Scores:       Tricep reflexes are 2+ on the right side and 2+ on the left side  Bicep reflexes are 2+ on the right side and 2+ on the left side  Brachioradialis reflexes are 2+ on the right side and 2+ on the left side  Patellar reflexes are 2+ on the right side and 2+ on the left side  Achilles reflexes are 0 on the right side and 2+ on the left side

## 2018-10-30 ENCOUNTER — HOSPITAL ENCOUNTER (OUTPATIENT)
Dept: NEUROLOGY | Facility: HOSPITAL | Age: 33
Discharge: HOME | End: 2018-10-30
Payer: OTHER MISCELLANEOUS
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
## 2018-10-30 DIAGNOSIS — M51.17 INTERVERTEBRAL DISC DISORDERS WITH RADICULOPATHY, LUMBOSACRAL REGION: ICD-10-CM

## 2018-10-30 PROCEDURE — 95908 NRV CNDJ TST 3-4 STUDIES: CPT | Performed by: PSYCHIATRY & NEUROLOGY

## 2018-10-30 PROCEDURE — 95886 MUSC TEST DONE W/N TEST COMP: CPT | Performed by: PSYCHIATRY & NEUROLOGY

## 2018-10-30 NOTE — PROCEDURES
[facility], [place]  Electromyography and Nerve Conduction Study      Patient Name:  Lianet Ty  MRN: [id number]   :  1985 Date performed: 10/30/2018    Age: 26 y o  Consult request by: Nayely Clark DO      HISTORY:  Lianet Ty is a 26 y o  female who complains of back pain as well as pain and tingling in her right lower extremity  In 2016, she was lifting a heavy patient's legs and heard a pop in her back  Several hours later, she noticed pain in her back  This pain subsequently began to radiate down the right lower extremity  She also suffers from tingling in the entire right lower extremity from the hip down, involving its entire circumference  She states that she has difficulty walking secondary to her right lower extremity being delayed relative to the left  She denies any symptoms in the left lower extremity, such as numbness, tingling, weakness, or pain  She is referred to rule out a lumbosacral radiculopathy  Past Medical History:   Diagnosis Date    Back injury     Eczema          Current Outpatient Prescriptions:     cetirizine (ZyrTEC) 10 mg tablet, Take 10 mg by mouth daily, Disp: , Rfl:     cyclobenzaprine (FLEXERIL) 5 mg tablet, Take one or two about an hour before going to bed every night , Disp: 30 tablet, Rfl: 0    diphenhydrAMINE (BENADRYL) 25 mg capsule, Take 1 capsule by mouth daily at bedtime as needed  , Disp: , Rfl:     ferrous sulfate 325 (65 Fe) mg tablet, Take 325 mg by mouth daily with breakfast, Disp: , Rfl:     multivitamin (THERAGRAN) TABS, Take 1 tablet by mouth daily, Disp: , Rfl:     Naproxen Sodium (ALEVE) 220 MG CAPS, Take by mouth, Disp: , Rfl:     vitamin B-12 (CYANOCOBALAMIN) 100 MCG tablet, Take 100 mcg by mouth daily, Disp: , Rfl:     PHYSICAL EXAMINATION:  In general, she was in no acute distress  Lower extremity power was grade 5 bilaterally    Vibration to maximal strike was 5 seconds at the right great toe and 14 seconds at the left great toe  Pinprick sensation was diffusely increased over the right lower extremity relative to the left in a non-anatomic distribution  Knee jerks were grade 3 and bilaterally symmetric  Ankle jerks were grade 2 and bilaterally symmetric  RESULTS:  EMG/NCS data and waveforms that were performed for this study have been scanned into Epic  Please refer to scanned document for waveforms  IMPRESSION:   This is a normal study  There is no electrophysiologic evidence of a lumbosacral radiculopathy on the right, the patient's symptomatic side  There is no electrophysiologic evidence of a generalized large fiber polyneuropathy  There is no electrophysiologic evidence of a right lower extremity mononeuropathy (e g  peroneal)  Note that this study does not exclude a small fiber" neuropathy, for which routine electrodiagnostic testing is insensitive  Clinical correlation is advised      Daisy Lozano MD

## 2022-11-28 ENCOUNTER — HOSPITAL ENCOUNTER (EMERGENCY)
Facility: HOSPITAL | Age: 37
Discharge: HOME | End: 2022-11-28
Attending: EMERGENCY MEDICINE

## 2022-11-28 VITALS
TEMPERATURE: 98.6 F | OXYGEN SATURATION: 95 % | SYSTOLIC BLOOD PRESSURE: 139 MMHG | HEART RATE: 95 BPM | DIASTOLIC BLOOD PRESSURE: 87 MMHG | RESPIRATION RATE: 20 BRPM

## 2022-11-28 DIAGNOSIS — R68.89 FLU-LIKE SYMPTOMS: Primary | ICD-10-CM

## 2022-11-28 RX ORDER — OXYMETAZOLINE HYDROCHLORIDE 0.05 G/100ML
2 SPRAY NASAL ONCE
Status: COMPLETED | OUTPATIENT
Start: 2022-11-28 | End: 2022-11-28

## 2022-11-28 RX ORDER — IBUPROFEN 400 MG/1
400 TABLET ORAL EVERY 6 HOURS PRN
Qty: 30 TABLET | Refills: 0 | Status: SHIPPED | OUTPATIENT
Start: 2022-11-28 | End: 2022-12-05

## 2022-11-28 RX ORDER — ACETAMINOPHEN 325 MG/1
650 TABLET ORAL EVERY 6 HOURS PRN
Qty: 30 TABLET | Refills: 0 | Status: SHIPPED | OUTPATIENT
Start: 2022-11-28

## 2022-11-28 RX ORDER — BENZONATATE 100 MG/1
100 CAPSULE ORAL EVERY 8 HOURS
Qty: 21 CAPSULE | Refills: 0 | Status: SHIPPED | OUTPATIENT
Start: 2022-11-28

## 2022-11-28 RX ORDER — ACETAMINOPHEN 325 MG/1
975 TABLET ORAL ONCE
Status: COMPLETED | OUTPATIENT
Start: 2022-11-28 | End: 2022-11-28

## 2022-11-28 RX ORDER — OXYMETAZOLINE HYDROCHLORIDE 0.05 G/100ML
2 SPRAY NASAL 2 TIMES DAILY
Qty: 1.2 ML | Refills: 0 | Status: SHIPPED | OUTPATIENT
Start: 2022-11-28 | End: 2022-12-01

## 2022-11-28 RX ADMIN — DEXAMETHASONE SODIUM PHOSPHATE 10 MG: 10 INJECTION, SOLUTION INTRAMUSCULAR; INTRAVENOUS at 08:08

## 2022-11-28 RX ADMIN — OXYMETAZOLINE HYDROCHLORIDE 2 SPRAY: 0.05 SPRAY NASAL at 08:08

## 2022-11-28 RX ADMIN — ACETAMINOPHEN 975 MG: 325 TABLET ORAL at 08:08

## 2022-11-28 NOTE — Clinical Note
Patsy Canela was seen and treated in our emergency department on 11/28/2022  No restrictions            Diagnosis: Flu    Darleen    She may return on this date: 11/30/2022         If you have any questions or concerns, please don't hesitate to call        Jourdan Hutchinson MD    ______________________________           _______________          _______________  Hospital Representative                              Date                                Time

## 2022-11-28 NOTE — ED PROVIDER NOTES
Final Diagnosis:  1  Flu-like symptoms      ED Course as of 11/28/22 0825   Mon Nov 28, 2022   9546 This is a 43 y/o F presenting for flu-like illness  Her brother was sick with Influenza  He was at [facility]  Since the following day, the patietn and patient's mom had similar   +cough  +congestion/rhinorrhea  +sore throat  +body aches  +cough with significant mucous  Feels weak due to it  Poor oral intake due to the coughing  Denies falls/trauma  Denies any urinary tract infection symptoms (burning, itching, pain, blood, frequency)  Tried motrin around [place] but it didn't work     Lolis Singletonur PHM/PSH denied  +smoking but quit 2 years ago  No alcohol/drugs     0807 General: VSS, NAD, awake, alert  Well-nourished, well-developed  Appears stated age  Speaking normally in full sentences  Significant rhinorrhea   Saturating 98%  HR 84 on pulse ox  Head: Normocephalic, atraumatic, nontender  Eyes: PERRL, EOM-I  No diplopia  No hyphema  No subconjunctival hemorrhages  Symmetrical lids  ENT: Atraumatic external nose and ears  MMM  Mildly injected back of throat  No malocclusion  No stridor  Normal phonation  No drooling  Normal swallowing  Neck: Symmetric, trachea midline  No JVD  CV: RRR  +S1/S2  No murmurs or gallops  Peripheral pulses +2 throughout  No chest wall tenderness  Lungs:   Unlabored No retractions  CTAB, lungs sounds equal bilateral    No tachypnea  Abd: +BS, soft, NT/ND    MSK:   FROM   Back:   No rashes  Skin: Dry, intact  Neuro: AAOx3, GCS 15, CN II-XII grossly intact  Motor grossly intact  Psychiatric/Behavioral: Appropriate mood and affect   Exam: deferred   0807 A/P:  - viral syndrome  - discused likely flu since brother has it  - went over RTER precautions  - offered CXR + Flu testing but also said very low yield given normal exam  - discussed honey  - discussed supportive measures  - za okay with plan     0825 I gave oral return precautions for what to return for in addition to the written return precautions  The patient verbalized understanding of the discharge instructions and warnings that would necessitate return to the Emergency Department  I specifically highlighted areas of special concern regarding the written and verbal discharge instructions and return precautions  All questions were answered prior to discharge  Chief Complaint   Patient presents with   • Flu Symptoms     Pt reports fevers up to 102, productive cough, SOB when getting out of bed, lethargy; pt reports brother is FLU+ and has pneumonia     PMH:   has a past medical history of Back injury and Eczema  PSH:   has a past surgical history that includes Back surgery  Social:  Social History     Substance and Sexual Activity   Alcohol Use No     Social History     Tobacco Use   Smoking Status Former   • Types: Cigarettes   • Quit date: 2018   • Years since quittin 8   Smokeless Tobacco Never     Social History     Substance and Sexual Activity   Drug Use No     PE:   Vitals:    22 0750   BP: 139/87   BP Location: Left arm   Pulse: 95   Resp: 20   Temp: 98 6 °F (37 °C)   TempSrc: Oral   SpO2: 95%     - 13 point ROS was performed and all are normal unless stated in the history above  - Nursing note reviewed  Vitals reviewed  - Orders placed by myself and/or advanced practitioner / resident     - Previous chart was reviewed  - No language barrier    - History obtained from patient  - There are no limitations to the history obtained  - Critical care time: Not applicable for this patient  Medications   acetaminophen (TYLENOL) tablet 975 mg (975 mg Oral Given 22)   oxymetazoline (AFRIN) 0 05 % nasal spray 2 spray (2 sprays Each Nare Given 22)   dexamethasone oral liquid 10 mg 1 mL (10 mg Oral Given 22)     No orders to display     No orders of the defined types were placed in this encounter      Labs Reviewed - No data to display  Time reflects when diagnosis was documented in both MDM as applicable and the Disposition within this note     Time User Action Codes Description Comment    11/28/2022  8:02 AM Jorden Trejo Add [R68 89] Flu-like symptoms       ED Disposition     ED Disposition   Discharge    Condition   Stable    Date/Time   Mon Nov 28, 2022  8:02 AM    Comment   Arcelia Fonseca discharge to home/self care  Follow-up Information     Follow up With Specialties Details Why Contact Info Additional [address] [facility] Emergency Medicine Go to  If symptoms worsen [place] [address] [phone]  [address] Emergency Department, [address], 750 [facility], 10 [facility] Internal Medicine, Nurse Practitioner Call  To make appointment for re-evaluation in 1 week [address]   [address] [facility]  [phone]           Patient's Medications   Discharge Prescriptions    ACETAMINOPHEN (TYLENOL) 325 MG TABLET    Take 2 tablets (650 mg total) by mouth every 6 (six) hours as needed for mild pain or fever       Start Date: 11/28/2022End Date: --       Order Dose: 650 mg       Quantity: 30 tablet    Refills: 0    BENZONATATE (TESSALON PERLES) 100 MG CAPSULE    Take 1 capsule (100 mg total) by mouth every 8 (eight) hours       Start Date: 11/28/2022End Date: --       Order Dose: 100 mg       Quantity: 21 capsule    Refills: 0    IBUPROFEN (MOTRIN) 400 MG TABLET    Take 1 tablet (400 mg total) by mouth every 6 (six) hours as needed for mild pain for up to 7 days       Start Date: 11/28/2022End Date: 12/5/2022       Order Dose: 400 mg       Quantity: 30 tablet    Refills: 0    OXYMETAZOLINE (AFRIN) 0 05 % NASAL SPRAY    2 sprays by Each Nare route 2 (two) times a day for 3 days       Start Date: 11/28/2022End Date: 12/1/2022       Order Dose: 2 sprays       Quantity: 1 2 mL Refills: 0     No discharge procedures on file  Prior to Admission Medications   Prescriptions Last Dose Informant Patient Reported? Taking? Naproxen Sodium (ALEVE) 220 MG CAPS   Yes No   Sig: Take by mouth   cetirizine (ZyrTEC) 10 mg tablet   Yes No   Sig: Take 10 mg by mouth daily   cyclobenzaprine (FLEXERIL) 5 mg tablet   No No   Sig: Take one or two about an hour before going to bed every night  diphenhydrAMINE (BENADRYL) 25 mg capsule   Yes No   Sig: Take 1 capsule by mouth daily at bedtime as needed     ferrous sulfate 325 (65 Fe) mg tablet   Yes No   Sig: Take 325 mg by mouth daily with breakfast   multivitamin (THERAGRAN) TABS   Yes No   Sig: Take 1 tablet by mouth daily   vitamin B-12 (CYANOCOBALAMIN) 100 MCG tablet   Yes No   Sig: Take 100 mcg by mouth daily      Facility-Administered Medications: None       Portions of the record may have been created with voice recognition software  Occasional wrong word or "sound a like" substitutions may have occurred due to the inherent limitations of voice recognition software  Read the chart carefully and recognize, using context, where substitutions have occurred      Electronically signed by:  Kwesi Jackson MD  11/28/22 2461